# Patient Record
Sex: FEMALE | Race: WHITE | URBAN - METROPOLITAN AREA
[De-identification: names, ages, dates, MRNs, and addresses within clinical notes are randomized per-mention and may not be internally consistent; named-entity substitution may affect disease eponyms.]

---

## 2018-04-02 ENCOUNTER — OFFICE VISIT (OUTPATIENT)
Dept: ORTHOPEDIC SURGERY | Age: 63
End: 2018-04-02

## 2018-04-02 VITALS
HEIGHT: 62 IN | OXYGEN SATURATION: 95 % | BODY MASS INDEX: 24.8 KG/M2 | WEIGHT: 134.8 LBS | SYSTOLIC BLOOD PRESSURE: 133 MMHG | DIASTOLIC BLOOD PRESSURE: 84 MMHG | HEART RATE: 74 BPM

## 2018-04-02 DIAGNOSIS — M54.16 LUMBAR NEURITIS: ICD-10-CM

## 2018-04-02 DIAGNOSIS — M47.816 SPONDYLOSIS OF LUMBAR REGION WITHOUT MYELOPATHY OR RADICULOPATHY: ICD-10-CM

## 2018-04-02 DIAGNOSIS — M54.5 LOW BACK PAIN, UNSPECIFIED BACK PAIN LATERALITY, UNSPECIFIED CHRONICITY, WITH SCIATICA PRESENCE UNSPECIFIED: Primary | ICD-10-CM

## 2018-04-02 DIAGNOSIS — M51.36 DDD (DEGENERATIVE DISC DISEASE), LUMBAR: ICD-10-CM

## 2018-04-02 RX ORDER — METHYLPREDNISOLONE 4 MG/1
TABLET ORAL
Qty: 1 DOSE PACK | Refills: 0 | Status: SHIPPED | OUTPATIENT
Start: 2018-04-02 | End: 2018-04-30 | Stop reason: ALTCHOICE

## 2018-04-02 RX ORDER — CHOLECALCIFEROL (VITAMIN D3) 125 MCG
CAPSULE ORAL
COMMUNITY

## 2018-04-02 RX ORDER — IBUPROFEN 200 MG
CAPSULE ORAL
COMMUNITY

## 2018-04-02 NOTE — PROGRESS NOTES
[facility]  [address], [address]  Phone: [phone]  Fax: [phone]        INITIAL CONSULTATION      HISTORY OF PRESENT ILLNESS:  Oracio Marshall is a 66 y.o. female whom is self-referred secondary to progressive low back pain extending into the BLE (L>R) in an L5 distribution to the ankle in the LLE and in a similar distribution to the mid-thigh in the RLE x 1+ year. Pt did experience symptoms extending into the left foot on a single occasion. Low back pain > extremity symptoms. No specific injury/trauma. She rates pain 6/10. 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 Pt self-treats with Naproxen and Ibuprofen. Pt denies h/o lumbar spinal surgery/blocks. She has not attended physical therapy/chiropractor. Pt denies change in bowel or bladder habits. Pt denies fever, weight loss, or skin changes. Pt denies h/o DM, gastric bypass, stomach ulcers or bleeding disorders. The patient is RHD.  reviewed. Body mass index is 24.66 kg/(m^2). History reviewed. No pertinent past medical history. History reviewed. No pertinent surgical history. Social History   Substance Use Topics    Smoking status: Never Smoker    Smokeless tobacco: Never Used    Alcohol use No     Work status: Not available. Marital status: Not available. Current Outpatient Prescriptions   Medication Sig Dispense Refill    naproxen sodium 220 mg cap Take  by mouth.  ibuprofen 200 mg cap Take  by mouth.       methylPREDNISolone (MEDROL DOSEPACK) 4 mg tablet Per dose pack instructions 1 Dose Pack 0       No Known Allergies         Family History   Problem Relation Age of Onset    Stroke Mother     Arthritis-osteo Mother     Stroke Father     Parkinson's Disease Father     Cancer Sister          REVIEW OF SYSTEMS  Constitutional symptoms: Negative  Eyes: Negative  Ears, Nose, Throat, and Mouth: Negative  Cardiovascular: Negative  Respiratory: Negative  Genitourinary: Negative  Integumentary (Skin and/or breast): Negative  Musculoskeletal: Positive for low back pain into the BLE. Extremities: Negative for edema. Endocrine/Rheumatologic: Negative  Hematologic/Lymphatic: Negative  Allergic/Immunologic: Negative  Psychiatric: Negative       PHYSICAL EXAMINATION    Visit Vitals    /84 (BP 1 Location: Left arm, BP Patient Position: Sitting)    Pulse 74    Ht 5' 2\" (1.575 m)    Wt 134 lb 12.8 oz (61.1 kg)    LMP  (LMP Unknown)    SpO2 95%    BMI 24.66 kg/m2       CONSTITUTIONAL: NAD, A&O x 3  HEART: Regular rate and rhythm  ABDOMEN: Positive bowel sounds, soft, nontender, and nondistended  LUNGS: Clear to auscultation bilaterally. SKIN: Negative for rash. RANGE OF MOTION: The patient has full passive range of motion in all four extremities. SENSATION: Sensation is intact to light touch throughout. MOTOR:   Straight Leg Raise: Negative, bilateral  Gutierrez: Negative, bilateral  Deep tendon reflexes are 0 at the brachioradialis and triceps, 1+ at the biceps. Deep tendon reflexes are 2+ at the knees and 0 at the ankles bilaterally. Shoulder AB/Flex Elbow Flex Wrist Ext Elbow Ext Wrist Flex Hand Intrin Tone   Right +4/5 +4/5 +4/5 +4/5 +4/5 +4/5 +4/5   Left +4/5 +4/5 +4/5 +4/5 +4/5 +4/5 +4/5              Hip Flex Knee Ext Knee Flex Ankle DF GTE Ankle PF Tone   Right +4/5 +4/5 +4/5 +4/5 +4/5 +4/5 +4/5   Left +4/5 +4/5 +4/5 +4/5 +4/5 +4/5 +4/5     RADIOGRAPHS  Preliminary reading of lumbar plain films:  Mild disc space narrowing at L1-2, L2-3, L4-5, L5-S1. Small anterior osteophytes noted throughout the lumbar spine. No acute pathology identified. These are being sent out for official reading by Dr. Michael Cardozo. ASSESSMENT   Diagnoses and all orders for this visit:    1.  Low back pain, unspecified back pain laterality, unspecified chronicity, with sciatica presence unspecified  - [17195] L/S 2-3 views  -     REFERRAL TO PHYSICAL THERAPY    2. Spondylosis of lumbar region without myelopathy or radiculopathy  -     REFERRAL TO PHYSICAL THERAPY    3. Lumbar neuritis  -     REFERRAL TO PHYSICAL THERAPY    4. DDD (degenerative disc disease), lumbar  -     REFERRAL TO PHYSICAL THERAPY    Other orders  -     methylPREDNISolone (MEDROL DOSEPACK) 4 mg tablet; Per dose pack instructions           IMPRESSIONS/RECOMMENDATIONS:  The patient describes symptoms consistent with L5 radiculopathy. She is neurologically intact. I discussed with patient multiple treatment options including medications, physical therapy, lumbar blocks, and surgery. I will start her on Medrol Dosepak. She will resume her OTC Ibuprofen 800 mg TID x 2 weeks following completion of the Medrol Dosepak. I will refer her to physical therapy with an emphasis on HEP. I will see the patient back in 1 month's time or earlier if needed. Written by Sunil Melendez, as dictated by Shanell Calvillo MD  I examined the patient, reviewed and agree with the note.

## 2018-04-02 NOTE — MR AVS SNAPSHOT
[address] [facility] 
 
 
 [address] [facility] 
[phone] Patient: Earline Gannon MRN: [id number] Visit Information Date & Time Provider Department Dept. Phone Encounter #  
 4/2/2018  3:05 PM Ariane Moe MD [facility] [place] [phone] [id number] Follow-up Instructions Return in about 4 weeks (around 4/30/2018). Upcoming Health Maintenance Date Due DTaP/Tdap/Td series (1 - Tdap) 2/6/1978 PAP AKA CERVICAL CYTOLOGY 2/6/1978 BREAST CANCER SCRN MAMMOGRAM 2/6/2007 FOBT Q 1 YEAR AGE 50-75 2/6/2007 ZOSTER VACCINE AGE 60> 12/6/2016 Influenza Age 5 to Adult 8/1/2017 Allergies as of 4/2/2018  Review Complete On: 4/2/2018 By: Ariane Moe MD  
 No Known Allergies Current Immunizations  Never Reviewed No immunizations on file. Not reviewed this visit You Were Diagnosed With   
  
 Codes Comments Low back pain, unspecified back pain laterality, unspecified chronicity, with sciatica presence unspecified    -  Primary ICD-10-CM: M54.5 ICD-9-CM: 724.2 Spondylosis of lumbar region without myelopathy or radiculopathy     ICD-10-CM: M47.816 ICD-9-CM: 721.3 Lumbar neuritis     ICD-10-CM: M54.16 
ICD-9-CM: 724.4 DDD (degenerative disc disease), lumbar     ICD-10-CM: M51.36 
ICD-9-CM: 722.52 Vitals BP Pulse Height(growth percentile) Weight(growth percentile) LMP SpO2  
 133/84 (BP 1 Location: Left arm, BP Patient Position: Sitting) 74 5' 2\" (1.575 m) 134 lb 12.8 oz (61.1 kg) (LMP Unknown) 95% BMI OB Status Smoking Status 24.66 kg/m2 Postmenopausal Never Smoker Vitals History BMI and BSA Data Body Mass Index Body Surface Area  
 24.66 kg/m 2 1.63 m 2 Preferred Pharmacy Pharmacy Name Phone [facility] [address], [address] [place] [phone] Your Updated Medication List  
  
   
This list is accurate as of 4/2/18  4:08 PM.  Always use your most recent med list.  
  
  
  
  
 ibuprofen 200 mg Cap Take  by mouth.  
  
 methylPREDNISolone 4 mg tablet Commonly known as:  Arleen Barrera Per dose pack instructions  
  
 naproxen sodium 220 mg Cap Take  by mouth. Prescriptions Sent to Pharmacy Refills  
 methylPREDNISolone (MEDROL DOSEPACK) 4 mg tablet 0 Sig: Per dose pack instructions Class: Normal  
 Pharmacy: [facility] [address], [facility] #: [phone] We Performed the Following AMB POC XRAY, SPINE, LUMBOSACRAL; 2 O [38106 CPT(R)] REFERRAL TO PHYSICAL THERAPY [JNZ59 Custom] Comments:  
 DX: eval and treat lsp lle HEP 
LOCATION: [place] [address] 
2-3 visits/ 2-3 weeks Follow-up Instructions Return in about 4 weeks (around 4/30/2018). Referral Information Referral ID Referred By Referred To  
  
 [id number] Carmel Mckeon Not Available Visits Status Start Date End Date 1 New Request 4/2/18 4/2/19 If your referral has a status of pending review or denied, additional information will be sent to support the outcome of this decision. Introducing [facility] & HEALTH SERVICES! [employer] introduces [employer] patient portal. Now you can access parts of your medical record, email your doctor's office, and request medication refills online. 1. In your internet browser, go to [email or web address] 2. Click on the First Time User? Click Here link in the Sign In box. You will see the New Member Sign Up page. 3. Enter your Lore Access Code exactly as it appears below. You will not need to use this code after youve completed the sign-up process. If you do not sign up before the expiration date, you must request a new code. · Lore Access Code: [email or web address] Expires: 7/1/2018  2:56 PM 
 
 4. Enter the last four digits of your Social Security Number (xxxx) and Date of Birth (mm/dd/yyyy) as indicated and click Submit. You will be taken to the next sign-up page. 5. Create a Shozu ID. This will be your Shozu login ID and cannot be changed, so think of one that is secure and easy to remember. 6. Create a Shozu password. You can change your password at any time. 7. Enter your Password Reset Question and Answer. This can be used at a later time if you forget your password. 8. Enter your e-mail address. You will receive e-mail notification when new information is available in [address] 9. Click Sign Up. You can now view and download portions of your medical record. 10. Click the Download Summary menu link to download a portable copy of your medical information. If you have questions, please visit the Frequently Asked Questions section of the Shozu website. Remember, Shozu is NOT to be used for urgent needs. For medical emergencies, dial 911. Now available from your iPhone and Android! Please provide this summary of care documentation to your next provider. Your primary care clinician is listed as Tony Watkins. If you have any questions after today's visit, please call [phone].

## 2018-04-30 ENCOUNTER — OFFICE VISIT (OUTPATIENT)
Dept: ORTHOPEDIC SURGERY | Age: 63
End: 2018-04-30

## 2018-04-30 VITALS
DIASTOLIC BLOOD PRESSURE: 82 MMHG | HEIGHT: 62 IN | BODY MASS INDEX: 24.66 KG/M2 | WEIGHT: 134 LBS | HEART RATE: 66 BPM | SYSTOLIC BLOOD PRESSURE: 121 MMHG

## 2018-04-30 DIAGNOSIS — M47.816 SPONDYLOSIS OF LUMBAR REGION WITHOUT MYELOPATHY OR RADICULOPATHY: Primary | ICD-10-CM

## 2018-04-30 DIAGNOSIS — M54.16 LUMBAR NEURITIS: ICD-10-CM

## 2018-04-30 DIAGNOSIS — M51.36 DDD (DEGENERATIVE DISC DISEASE), LUMBAR: ICD-10-CM

## 2018-04-30 NOTE — MR AVS SNAPSHOT
[address] [facility] 
 
 
 [address] 200 [facility] 
[phone] Patient: Jesus Vann MRN: [id number] Visit Information Date & Time Provider Department Dept. Phone Encounter #  
 4/30/2018  3:40 PM Carie Blackburn MD [facility], [address] and [facility] [place] [phone] Follow-up Instructions Return in about 4 weeks (around 5/28/2018). Upcoming Health Maintenance Date Due Hepatitis C Screening 1955 DTaP/Tdap/Td series (1 - Tdap) 2/6/1978 PAP AKA CERVICAL CYTOLOGY 2/6/1978 BREAST CANCER SCRN MAMMOGRAM 2/6/2007 FOBT Q 1 YEAR AGE 50-75 2/6/2007 ZOSTER VACCINE AGE 60> 12/6/2016 Influenza Age 5 to Adult 8/1/2018 Allergies as of 4/30/2018  Review Complete On: 4/30/2018 By: Carie Blackburn MD  
 No Known Allergies Current Immunizations  Never Reviewed No immunizations on file. Not reviewed this visit You Were Diagnosed With   
  
 Codes Comments Spondylosis of lumbar region without myelopathy or radiculopathy    -  Primary ICD-10-CM: M47.816 ICD-9-CM: 721.3 Lumbar neuritis     ICD-10-CM: M54.16 
ICD-9-CM: 724.4 DDD (degenerative disc disease), lumbar     ICD-10-CM: M51.36 
ICD-9-CM: 722.52 Vitals BP Pulse Height(growth percentile) Weight(growth percentile) LMP BMI  
 121/82 66 5' 2\" (1.575 m) 134 lb (60.8 kg) (LMP Unknown) 24.51 kg/m2 OB Status Smoking Status Postmenopausal Never Smoker Vitals History BMI and BSA Data Body Mass Index Body Surface Area 24.51 kg/m 2 1.63 m 2 Preferred Pharmacy Pharmacy Name Phone [facility] [address], [address] [place] [phone] Your Updated Medication List  
  
   
This list is accurate as of 4/30/18  4:23 PM.  Always use your most recent med list.  
  
  
  
  
 ibuprofen 200 mg Cap Take  by mouth. naproxen sodium 220 mg Cap Take  by mouth. Follow-up Instructions Return in about 4 weeks (around 5/28/2018). Introducing [facility] & HE[facility]! [facility] introduces [employer] patient portal. Now you can access parts of your medical record, email your doctor's office, and request medication refills online. 1. In your internet browser, go to [email or web address] 2. Click on the First Time User? Click Here link in the Sign In box. You will see the New Member Sign Up page. 3. Enter your [employer] Access Code exactly as it appears below. You will not need to use this code after youve completed the sign-up process. If you do not sign up before the expiration date, you must request a new code. · [employer] Access Code: [email or web address] Expires: 7/1/2018  2:56 PM 
 
4. Enter the last four digits of your Social Security Number (xxxx) and Date of Birth (mm/dd/yyyy) as indicated and click Submit. You will be taken to the next sign-up page. 5. Create a [employer] ID. This will be your [employer] login ID and cannot be changed, so think of one that is secure and easy to remember. 6. Create a [employer] password. You can change your password at any time. 7. Enter your Password Reset Question and Answer. This can be used at a later time if you forget your password. 8. Enter your e-mail address. You will receive e-mail notification when new information is available in [address] 9. Click Sign Up. You can now view and download portions of your medical record. 10. Click the Download Summary menu link to download a portable copy of your medical information. If you have questions, please visit the Frequently Asked Questions section of the [employer] website. Remember, [employer] is NOT to be used for urgent needs. For medical emergencies, dial 911. Now available from your iPhone and Android! Please provide this summary of care documentation to your next provider. Your primary care clinician is listed as Savanah Lazcano. If you have any questions after today's visit, please call [phone].

## 2018-04-30 NOTE — PROGRESS NOTES
[facility]  [address] [facility]  Anna Aldrich, [address]  Phone: [phone]  Fax: [phone]        PROGRESS NOTE      HISTORY OF PRESENT ILLNESS:  The patient is a 66 y.o. female and was seen today for follow up of progressive low back pain extending into the BLE (L>R) in an L5 distribution to the ankle in the LLE and in a similar distribution to the mid-thigh in the RLE x 1+ year. Pt did experience symptoms extending into the left foot on a single occasion. Low back pain > extremity symptoms. No specific injury/trauma. 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 Pt self-treats with Naproxen and Ibuprofen. Pt denies h/o lumbar spinal surgery/blocks. Pt denies change in bowel or bladder habits. Pt denies fever, weight loss, or skin changes. Pt denies h/o DM, gastric bypass, stomach ulcers or bleeding disorders. Preliminary reading of lumbar plain films revealed mild disc space narrowing at L1-2, L2-3, L4-5, L5-S1. Small anterior osteophytes noted throughout the lumbar spine. No acute pathology identified. The patient is RHD. At her last clinical appointment, the patient described symptoms consistent with L5 radiculopathy. She was neurologically intact. I discussed with patient multiple treatment options including medications, physical therapy, lumbar blocks, and surgery. I started her on Medrol Dosepak. She resumed her OTC Ibuprofen 800 mg TID x 2 weeks following completion of the Medrol Dosepak. I referred her to physical therapy with an emphasis on HEP. The patient returns today with pain location and distribution remain unchanged. She rates pain 4/10, slightly improved since her last visit (6/10). Pt continues to be enrolled in physical therapy with moderate relief. She noted no significant improvement with MDP.  reviewed. Body mass index is 24.51 kg/(m^2).       PCP: Bartolome Sellers, MD      History reviewed. No pertinent past medical history. Social History     Social History    Marital status:      Spouse name: N/A    Number of children: N/A    Years of education: N/A     Occupational History    Not on file. Social History Main Topics    Smoking status: Never Smoker    Smokeless tobacco: Never Used    Alcohol use No    Drug use: No    Sexual activity: Not on file     Other Topics Concern    Not on file     Social History Narrative       Current Outpatient Prescriptions   Medication Sig Dispense Refill    naproxen sodium 220 mg cap Take  by mouth.  ibuprofen 200 mg cap Take  by mouth. No Known Allergies       PHYSICAL EXAMINATION    Visit Vitals    /82    Pulse 66    Ht 5' 2\" (1.575 m)    Wt 60.8 kg (134 lb)    LMP  (LMP Unknown)    BMI 24.51 kg/m2       CONSTITUTIONAL: NAD, A&O x 3  SENSATION: Intact to light touch throughout  RANGE OF MOTION: The patient has full passive range of motion in all four extremities. MOTOR:  Straight Leg Raise: Negative, bilateral               Hip Flex Knee Ext Knee Flex Ankle DF GTE Ankle PF Tone   Right +4/5 +4/5 +4/5 +4/5 +4/5 +4/5 +4/5   Left +4/5 +4/5 +4/5 +4/5 +4/5 +4/5 +4/5       ASSESSMENT   Diagnoses and all orders for this visit:    1. Spondylosis of lumbar region without myelopathy or radiculopathy    2. Lumbar neuritis    3. DDD (degenerative disc disease), lumbar          IMPRESSION AND PLAN:  Patient should complete physical therapy as prescribed and continue with her HEP daily. She declines aggressive treatment and would like to continue the current course of treatment. I will see the patient back in 1 month's time or earlier if needed. Written by John Segura, as dictated by Leia Whiteside MD  I examined the patient, reviewed and agree with the note.

## 2018-05-22 ENCOUNTER — TELEPHONE (OUTPATIENT)
Dept: ORTHOPEDIC SURGERY | Age: 63
End: 2018-05-22

## 2018-05-22 NOTE — TELEPHONE ENCOUNTER
Patient called in states at her last appt Dr. Michael Lowe discussed either trying Lyrica or Neurontin. Pt is requesting to start one of these now so when she has her appt on 06/04/18 she will know if she would like to continue it. Patient states she would prefer Lyrica to try but which even Dr. Michael Lowe thinks she should do she will. Patient uses [facility] in [address] [place] [address] as her pharmacy. She can be reached at [phone] this is a work number she states you may just ask for her.

## 2018-05-22 NOTE — TELEPHONE ENCOUNTER
Please review message below and advise.      Last Visit: 04/30/2018 with MD Tamra Howe    Next Appointment: 06/04/2018 with MD Tamra Howe

## 2018-05-22 NOTE — TELEPHONE ENCOUNTER
I would typically recommend Neurontin 1st as insurance is much more likely to deny the Lyrica if she hasnt tried neurontin 1st.  !00mg po TID ramp # 90 RF#1. Patient needs to realize her f/u appointment date will not give us enouge time to see if the med is helping, but will let us see if she is tolerating it.

## 2018-05-24 RX ORDER — GABAPENTIN 100 MG/1
100 CAPSULE ORAL 3 TIMES DAILY
Qty: 90 CAP | Refills: 1 | Status: SHIPPED | OUTPATIENT
Start: 2018-05-24 | End: 2020-04-14

## 2018-05-24 NOTE — TELEPHONE ENCOUNTER
Spoke to the patient and informed her of the previous message. I have sent in Neurontin 100mg for her and I have rescheduled her appointment as well. Advised patient to call back if she has any problems with the medication or any questions.

## 2018-06-20 ENCOUNTER — OFFICE VISIT (OUTPATIENT)
Dept: ORTHOPEDIC SURGERY | Age: 63
End: 2018-06-20

## 2018-06-20 VITALS
HEART RATE: 66 BPM | HEIGHT: 62 IN | SYSTOLIC BLOOD PRESSURE: 125 MMHG | WEIGHT: 135 LBS | BODY MASS INDEX: 24.84 KG/M2 | DIASTOLIC BLOOD PRESSURE: 89 MMHG

## 2018-06-20 DIAGNOSIS — M54.16 LUMBAR RADICULOPATHY: ICD-10-CM

## 2018-06-20 DIAGNOSIS — M47.816 SPONDYLOSIS OF LUMBAR REGION WITHOUT MYELOPATHY OR RADICULOPATHY: Primary | ICD-10-CM

## 2018-06-20 DIAGNOSIS — M51.36 DDD (DEGENERATIVE DISC DISEASE), LUMBAR: ICD-10-CM

## 2018-06-20 RX ORDER — GABAPENTIN 300 MG/1
300 CAPSULE ORAL 3 TIMES DAILY
Qty: 90 CAP | Refills: 1 | Status: SHIPPED | OUTPATIENT
Start: 2018-06-20 | End: 2020-04-14

## 2018-06-20 NOTE — MR AVS SNAPSHOT
[facility] 
 
 
 [address] [facility] 
[phone] Patient: Doris Arteaga MRN: [id number]:[id number] Visit Information Date & Time Provider Department Dept. Phone Encounter #  
 6/20/2018  3:40 PM Grzegorz Mcgraw MD 4 [facility], [address] and [facility] [place] [phone] Follow-up Instructions Return in about 4 weeks (around 7/18/2018). Upcoming Health Maintenance Date Due Hepatitis C Screening 1955 DTaP/Tdap/Td series (1 - Tdap) 2/6/1978 PAP AKA CERVICAL CYTOLOGY 2/6/1978 BREAST CANCER SCRN MAMMOGRAM 2/6/2007 FOBT Q 1 YEAR AGE 50-75 2/6/2007 ZOSTER VACCINE AGE 60> 12/6/2016 Influenza Age 5 to Adult 8/1/2018 Allergies as of 6/20/2018  Review Complete On: 6/20/2018 By: Grzegorz Mcgraw MD  
 No Known Allergies Current Immunizations  Never Reviewed No immunizations on file. Not reviewed this visit Vitals BP Pulse Height(growth percentile) Weight(growth percentile) LMP BMI  
 125/89 66 5' 2\" (1.575 m) 135 lb (61.2 kg) (LMP Unknown) 24.69 kg/m2 OB Status Smoking Status Postmenopausal Never Smoker Vitals History BMI and BSA Data Body Mass Index Body Surface Area  
 24.69 kg/m 2 1.64 m 2 Preferred Pharmacy Pharmacy Name Phone [address] [facility] [address], [address] [place] [phone] Your Updated Medication List  
  
   
This list is accurate as of 6/20/18  4:58 PM.  Always use your most recent med list.  
  
  
  
  
 * gabapentin 100 mg capsule Commonly known as:  NEURONTIN Take 1 Cap by mouth three (3) times daily. * gabapentin 300 mg capsule Commonly known as:  NEURONTIN Take 1 Cap by mouth three (3) times daily. ibuprofen 200 mg Cap Take  by mouth. naproxen sodium 220 mg Cap Take  by mouth. * Notice: This list has 2 medication(s) that are the same as other medications prescribed for you. Read the directions carefully, and ask your doctor or other care provider to review them with you. Prescriptions Sent to Pharmacy Refills  
 gabapentin (NEURONTIN) 300 mg capsule 1 Sig: Take 1 Cap by mouth three (3) times daily. Class: Normal  
 Pharmacy: [facility] [address], [address] [facility] #: [phone] Route: Oral  
  
Follow-up Instructions Return in about 4 weeks (around 7/18/2018). Introducing [address]! [address] introduces [employer] patient portal. Now you can access parts of your medical record, email your doctor's office, and request medication refills online. 1. In your internet browser, go to [email or web address] 2. Click on the First Time User? Click Here link in the Sign In box. You will see the New Member Sign Up page. 3. Enter your [employer] Access Code exactly as it appears below. You will not need to use this code after youve completed the sign-up process. If you do not sign up before the expiration date, you must request a new code. · [employer] Access Code: [email or web address] Expires: 7/1/2018  2:56 PM 
 
4. Enter the last four digits of your Social Security Number (xxxx) and Date of Birth (mm/dd/yyyy) as indicated and click Submit. You will be taken to the next sign-up page. 5. Create a [employer] ID. This will be your [employer] login ID and cannot be changed, so think of one that is secure and easy to remember. 6. Create a [employer] password. You can change your password at any time. 7. Enter your Password Reset Question and Answer. This can be used at a later time if you forget your password. 8. Enter your e-mail address. You will receive e-mail notification when new information is available in [address] 9. Click Sign Up. You can now view and download portions of your medical record. 10. Click the Download Summary menu link to download a portable copy of your medical information. If you have questions, please visit the Frequently Asked Questions section of the [employer] website. Remember, [employer] is NOT to be used for urgent needs. For medical emergencies, dial 911. Now available from your iPhone and Android! Please provide this summary of care documentation to your next provider. Your primary care clinician is listed as Minnette Counter. If you have any questions after today's visit, please call [phone].

## 2018-06-20 NOTE — PROGRESS NOTES
[facility]  [address], [place]   Phone: [phone]  Fax: [phone]        PROGRESS NOTE      HISTORY OF PRESENT ILLNESS:  The patient is a 66 y.o. female and was seen today for follow up of progressive low back pain extending into the BLE (L>R) in an L5 distribution to the ankle in the LLE and in a similar distribution to the mid-thigh in the RLE x 1+ year. Pt did experience symptoms extending into the left foot on a single occasion. Low back pain > extremity symptoms. No specific injury/trauma. 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 Pt self-treats with Naproxen and Ibuprofen. She noted no significant improvement with MDP. Pt denies h/o lumbar spinal surgery/blocks. Pt denies change in bowel or bladder habits. Pt denies fever, weight loss, or skin changes. Pt denies h/o DM, gastric bypass, stomach ulcers or bleeding disorders. Preliminary reading of lumbar plain films revealed mild disc space narrowing at L1-2, L2-3, L4-5, L5-S1. Small anterior osteophytes noted throughout the lumbar spine. No acute pathology identified. The patient is RHD. At her last clinical appointment, the patient completed physical therapy as prescribed and continue with her HEP daily. She declined aggressive treatment and wished to continue the current course of treatment. The patient returns today with pain location and distribution remain unchanged. She rates her pain 0-5/10, consistent with her last visit (4/10). Pt is tolerating Neurontin 100 mg TID with no relief. She reported a little fogginess the first day she took it. Pt has completed PT. She reports completing her HEP x 3 a week. Patient denies history of glaucoma. Pt denies change in bowel or bladder habits.  reviewed. Body mass index is 24.69 kg/(m^2). PCP: Jose Nix MD      History reviewed.  No pertinent past medical history. Social History     Social History    Marital status:      Spouse name: N/A    Number of children: N/A    Years of education: N/A     Occupational History    Not on file. Social History Main Topics    Smoking status: Never Smoker    Smokeless tobacco: Never Used    Alcohol use No    Drug use: No    Sexual activity: Not on file     Other Topics Concern    Not on file     Social History Narrative       Current Outpatient Prescriptions   Medication Sig Dispense Refill    gabapentin (NEURONTIN) 300 mg capsule Take 1 Cap by mouth three (3) times daily. 90 Cap 1    gabapentin (NEURONTIN) 100 mg capsule Take 1 Cap by mouth three (3) times daily. 90 Cap 1    naproxen sodium 220 mg cap Take  by mouth.  ibuprofen 200 mg cap Take  by mouth. No Known Allergies       PHYSICAL EXAMINATION    Visit Vitals    /89    Pulse 66    Ht 5' 2\" (1.575 m)    Wt 61.2 kg (135 lb)    LMP  (LMP Unknown)    BMI 24.69 kg/m2       CONSTITUTIONAL: NAD, A&O x 3  SENSATION: Intact to light touch throughout  RANGE OF MOTION: The patient has full passive range of motion in all four extremities. MOTOR:  Straight Leg Raise: Negative, bilateral               Hip Flex Knee Ext Knee Flex Ankle DF GTE Ankle PF Tone   Right +4/5 +4/5 +4/5 +4/5 +4/5 +4/5 +4/5   Left +4/5 +4/5 +4/5 +4/5 +4/5 +4/5 +4/5       ASSESSMENT   Diagnoses and all orders for this visit:    1. Spondylosis of lumbar region without myelopathy or radiculopathy    2. DDD (degenerative disc disease), lumbar    3. Lumbar radiculopathy    Other orders  -     gabapentin (NEURONTIN) 300 mg capsule; Take 1 Cap by mouth three (3) times daily. IMPRESSION AND PLAN:  I will increase her Neurontin to 300 mg TID. Patient advised to call the office if intolerant to higher dose. Pt wishes to defer lumbar blocks at this time. I recommend she continue her HEP daily.  I will see the patient back in 1 month's time or earlier if needed. Written by Gorge Lee, as dictated by Lurdes Gar MD  I examined the patient, reviewed and agree with the note.

## 2018-07-25 ENCOUNTER — OFFICE VISIT (OUTPATIENT)
Dept: ORTHOPEDIC SURGERY | Age: 63
End: 2018-07-25

## 2018-07-25 VITALS
WEIGHT: 138.4 LBS | OXYGEN SATURATION: 98 % | SYSTOLIC BLOOD PRESSURE: 127 MMHG | RESPIRATION RATE: 16 BRPM | BODY MASS INDEX: 25.47 KG/M2 | HEART RATE: 72 BPM | DIASTOLIC BLOOD PRESSURE: 84 MMHG | HEIGHT: 62 IN

## 2018-07-25 DIAGNOSIS — M51.36 DDD (DEGENERATIVE DISC DISEASE), LUMBAR: ICD-10-CM

## 2018-07-25 DIAGNOSIS — M47.816 SPONDYLOSIS OF LUMBAR REGION WITHOUT MYELOPATHY OR RADICULOPATHY: Primary | ICD-10-CM

## 2018-07-25 DIAGNOSIS — M54.16 LUMBAR RADICULOPATHY: ICD-10-CM

## 2018-07-25 NOTE — MR AVS SNAPSHOT
[address] [facility] 
 
 
 [address] [facility] 
[phone] Patient: Kita Choudhury MRN: [id number] KSP:7/2/2078 Visit Information Date & Time Provider Department Dept. Phone Encounter #  
 7/25/2018  4:00 PM Eder Russell MD [address] [facility] [place] [phone] [id number] Follow-up Instructions Return for following MRI. Upcoming Health Maintenance Date Due Hepatitis C Screening 1955 DTaP/Tdap/Td series (1 - Tdap) 2/6/1978 PAP AKA CERVICAL CYTOLOGY 2/6/1978 BREAST CANCER SCRN MAMMOGRAM 2/6/2007 FOBT Q 1 YEAR AGE 50-75 2/6/2007 ZOSTER VACCINE AGE 60> 12/6/2016 Influenza Age 5 to Adult 8/1/2018 Allergies as of 7/25/2018  Review Complete On: 7/25/2018 By: Eder Russell MD  
 No Known Allergies Current Immunizations  Never Reviewed No immunizations on file. Not reviewed this visit You Were Diagnosed With   
  
 Codes Comments Spondylosis of lumbar region without myelopathy or radiculopathy    -  Primary ICD-10-CM: M47.816 ICD-9-CM: 721.3 DDD (degenerative disc disease), lumbar     ICD-10-CM: M51.36 
ICD-9-CM: 722.52 Lumbar radiculopathy     ICD-10-CM: M54.16 
ICD-9-CM: 724.4 Vitals BP Pulse Resp Height(growth percentile) Weight(growth percentile) LMP  
 127/84 72 16 5' 2\" (1.575 m) 138 lb 6.4 oz (62.8 kg) (LMP Unknown) SpO2 BMI OB Status Smoking Status 98% 25.31 kg/m2 Postmenopausal Never Smoker BMI and BSA Data Body Mass Index Body Surface Area  
 25.31 kg/m 2 1.66 m 2 Preferred Pharmacy Pharmacy Name Phone [facility], [address] [place] [phone] Your Updated Medication List  
  
   
This list is accurate as of 7/25/18  4:43 PM.  Always use your most recent med list.  
  
  
  
  
 aspirin-acetaminophen-caffeine 250-250-65 mg per tablet Commonly known as:  EXCEDRIN ES Take 1 Tab by mouth. * gabapentin 100 mg capsule Commonly known as:  NEURONTIN Take 1 Cap by mouth three (3) times daily. * gabapentin 300 mg capsule Commonly known as:  NEURONTIN Take 1 Cap by mouth three (3) times daily. ibuprofen 200 mg Cap Take  by mouth. naproxen sodium 220 mg Cap Take  by mouth. * Notice: This list has 2 medication(s) that are the same as other medications prescribed for you. Read the directions carefully, and ask your doctor or other care provider to review them with you. Follow-up Instructions Return for following MRI. To-Do List   
 08/01/2018 Imaging:  MRI LUMB SPINE WO CONT Introducing [facility]! Tramaine Mobley introduces [employer] patient portal. Now you can access parts of your medical record, email your doctor's office, and request medication refills online. 1. In your internet browser, go to [email or web address] 2. Click on the First Time User? Click Here link in the Sign In box. You will see the New Member Sign Up page. 3. Enter your [employer] Access Code exactly as it appears below. You will not need to use this code after youve completed the sign-up process. If you do not sign up before the expiration date, you must request a new code. · [employer] Access Code: [email or web address] Expires: 10/23/2018  3:54 PM 
 
4. Enter the last four digits of your Social Security Number (xxxx) and Date of Birth (mm/dd/yyyy) as indicated and click Submit. You will be taken to the next sign-up page. 5. Create a [employer] exp[employer]st ID. This will be your [employer] login ID and cannot be changed, so think of one that is secure and easy to remember. 6. Create a [employer] password. You can change your password at any time. 7. Enter your Password Reset Question and Answer. This can be used at a later time if you forget your password. 8. Enter your e-mail address. You will receive e-mail notification when new information is available in [address] 9. Click Sign Up. You can now view and download portions of your medical record. 10. Click the Download Summary menu link to download a portable copy of your medical information. If you have questions, please visit the Frequently Asked Questions section of the [employer] website. Remember, [employer] is NOT to be used for urgent needs. For medical emergencies, dial 911. Now available from your iPhone and Android! Please provide this summary of care documentation to your next provider. Your primary care clinician is listed as Parmjit Christie. If you have any questions after today's visit, please call [phone].

## 2018-07-25 NOTE — PROGRESS NOTES
[facility]  [address] [facility]  [place], [address]  Phone: [phone]  Fax: [phone]        PROGRESS NOTE      HISTORY OF PRESENT ILLNESS:  The patient is a 66 y.o. female and was seen today for follow up of progressive low back pain extending into the BLE (L>R) in an L5 distribution to the ankle in the LLE and in a similar distribution to the mid-thigh in the RLE x 1+ year. Pt did experience symptoms extending into the left foot on a single occasion. Low back pain > extremity symptoms. No specific injury/trauma. 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 Pt self-treats with Naproxen and Ibuprofen. She noted no significant improvement with MDP. Pt has completed PT. Pt denies h/o lumbar spinal surgery/blocks. Pt denies change in bowel or bladder habits. Pt denies fever, weight loss, or skin changes. Pt denies h/o DM, gastric bypass, stomach ulcers or bleeding disorders. Preliminary reading of lumbar plain films revealed mild disc space narrowing at L1-2, L2-3, L4-5, L5-S1. Small anterior osteophytes noted throughout the lumbar spine. No acute pathology identified. The patient is RHD. At her last clinical appointment, I increased her Neurontin to 300 mg TID. Pt wished to defer lumbar blocks at that time. The patient returns today with pain location and distribution remain unchanged. She rates her pain 4/10, consistent with her last visit (0-5/10). She is not tolerating increased Neurontin 300 mg TID due to lethargy. She self discontinued her afternoon dose about 1 week ago. She reports completing her HEP daily. Pt denies change in bowel or bladder habits.  reviewed. Body mass index is 25.31 kg/(m^2). PCP: Parmjit Christie MD      History reviewed. No pertinent past medical history.      Social History     Social History    Marital status:      Spouse name: N/A    Number of children: N/A    Years of education: N/A     Occupational History    Not on file. Social History Main Topics    Smoking status: Never Smoker    Smokeless tobacco: Never Used    Alcohol use No    Drug use: No    Sexual activity: Not on file     Other Topics Concern    Not on file     Social History Narrative       Current Outpatient Prescriptions   Medication Sig Dispense Refill    aspirin-acetaminophen-caffeine (EXCEDRIN ES) 250-250-65 mg per tablet Take 1 Tab by mouth.  gabapentin (NEURONTIN) 300 mg capsule Take 1 Cap by mouth three (3) times daily. (Patient taking differently: Take 300 mg by mouth two (2) times a day.) 90 Cap 1    ibuprofen 200 mg cap Take  by mouth.  gabapentin (NEURONTIN) 100 mg capsule Take 1 Cap by mouth three (3) times daily. 90 Cap 1    naproxen sodium 220 mg cap Take  by mouth. No Known Allergies       PHYSICAL EXAMINATION    Visit Vitals    /84    Pulse 72    Resp 16    Ht 5' 2\" (1.575 m)    Wt 62.8 kg (138 lb 6.4 oz)    LMP  (LMP Unknown)    SpO2 98%    BMI 25.31 kg/m2       CONSTITUTIONAL: NAD, A&O x 3  SENSATION: Intact to light touch throughout  RANGE OF MOTION: The patient has full passive range of motion in all four extremities. MOTOR:  Straight Leg Raise: Negative, bilateral               Hip Flex Knee Ext Knee Flex Ankle DF GTE Ankle PF Tone   Right +4/5 +4/5 +4/5 +4/5 +4/5 +4/5 +4/5   Left +4/5 +4/5 +4/5 +4/5 +4/5 +4/5 +4/5       ASSESSMENT   Diagnoses and all orders for this visit:    1. Spondylosis of lumbar region without myelopathy or radiculopathy  -     MRI LUMB SPINE WO CONT; Future    2. DDD (degenerative disc disease), lumbar  -     MRI LUMB SPINE WO CONT; Future    3. Lumbar radiculopathy  -     MRI LUMB SPINE WO CONT; Future          IMPRESSION AND PLAN:  Patient should wean off Neurontin 300 mg TID. Patient is considering blocks. I will set her up for a lumbar spine MRI.  I advised patient to bring copies of films to next visit. I offered to start her on another neuropathic pain medication, but she wished to defer at this time. Patient is neurologically intact. I recommend she continue with her HEP daily. I will see the patient back following MRI. Written by Eliberto Sicard, as dictated by Jayda Aguirre MD  I examined the patient, reviewed and agree with the note.

## 2018-07-26 ENCOUNTER — DOCUMENTATION ONLY (OUTPATIENT)
Dept: ORTHOPEDIC SURGERY | Age: 63
End: 2018-07-26

## 2018-07-26 NOTE — PROGRESS NOTES
MRI Lumbar without is scheduled at [facility], [phone], on 07/28/18, arrive 10:45AM, test 11:00AM. No [facility] pre-authorization required.

## 2018-08-20 ENCOUNTER — TELEPHONE (OUTPATIENT)
Dept: ORTHOPEDIC SURGERY | Age: 63
End: 2018-08-20

## 2018-08-20 NOTE — TELEPHONE ENCOUNTER
Pt is calling to speak with Dr. Keren Marie nurse Boris Bronson about her upcoming appt 8/22/18. Please advise pt at [phone].

## 2018-08-21 NOTE — TELEPHONE ENCOUNTER
Spoke to patient this morning. She had questions regarding injections. Questions were answered. Patient is aware of her appointment on tomorrow and will bring the MRI disk with her.

## 2018-08-22 ENCOUNTER — OFFICE VISIT (OUTPATIENT)
Dept: ORTHOPEDIC SURGERY | Age: 63
End: 2018-08-22

## 2018-08-22 VITALS
DIASTOLIC BLOOD PRESSURE: 88 MMHG | HEART RATE: 68 BPM | SYSTOLIC BLOOD PRESSURE: 125 MMHG | BODY MASS INDEX: 24.88 KG/M2 | HEIGHT: 62 IN | RESPIRATION RATE: 14 BRPM | WEIGHT: 135.2 LBS

## 2018-08-22 DIAGNOSIS — M47.816 SPONDYLOSIS OF LUMBAR REGION WITHOUT MYELOPATHY OR RADICULOPATHY: Primary | ICD-10-CM

## 2018-08-22 DIAGNOSIS — M51.26 HNP (HERNIATED NUCLEUS PULPOSUS), LUMBAR: ICD-10-CM

## 2018-08-22 DIAGNOSIS — M51.36 DDD (DEGENERATIVE DISC DISEASE), LUMBAR: ICD-10-CM

## 2018-08-22 DIAGNOSIS — M54.16 LUMBAR RADICULOPATHY: ICD-10-CM

## 2018-08-22 NOTE — PROGRESS NOTES
[facility]  [address]  [address], [address]   Phone: [phone]  Fax: [phone]        PROGRESS NOTE      HISTORY OF PRESENT ILLNESS:  The patient is a 66 y.o. female and was seen today for follow up of progressive low back pain extending into the BLE (L>R) in an L5 distribution to the ankle in the LLE and in a similar distribution to the mid-thigh in the RLE x 1+ year. Pt did experience symptoms extending into the left foot on a single occasion. Low back pain > extremity symptoms. No specific injury/trauma. 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 Pt self-treats with Naproxen and Ibuprofen. She noted no significant improvement with MDP. She did not tolerate increased Neurontin 300 mg TID due to lethargy. Pt has completed PT. Pt denies h/o lumbar spinal surgery/blocks. Pt denies change in bowel or bladder habits. Pt denies fever, weight loss, or skin changes. Pt denies h/o DM, gastric bypass, stomach ulcers or bleeding disorders. Preliminary reading of lumbar plain films revealed mild disc space narrowing at L1-2, L2-3, L4-5, L5-S1. Small anterior osteophytes noted throughout the lumbar spine. No acute pathology identified. The patient is RHD. At her last clinical appointment, patient weaned off Neurontin 300 mg TID. Patient was considering blocks. I set her up for a lumbar spine MRI. I offered to start her on another neuropathic pain medication, but she wished to defer at that time. The patient returns today with pain location and distribution remain unchanged. She rates her pain 5-7/10, an increase from her last visit (4/10). She reports completing her HEP daily. Her pain is exacerbated by sitting. Pt denies change in bowel or bladder habits. Lumbar spine MRI dated 7/30/18 reviewed.  Per report, multilevel degenerative joint and disc disease with degenerative facet arthropathy at L3/4 and L4/5. This is most significant at L5/S1 where there is moderate discogenic marrow edema. There is also a ventral protruding disc at L5/S1. Posterior disc protrusions/herniations at L1/2 through L5/S1 encroaching upon the ventral thecal sac without significant level spinal canal stenosis. Foraminal narrowing at L2-L5/S1 without nerve root impingement. Upon review myself, there appears to be an annular tear on the left at L4.  reviewed. Body mass index is 24.73 kg/(m^2). PCP: Brina Hardy MD      History reviewed. No pertinent past medical history. Social History     Social History    Marital status:      Spouse name: N/A    Number of children: N/A    Years of education: N/A     Occupational History    Not on file. Social History Main Topics    Smoking status: Never Smoker    Smokeless tobacco: Never Used    Alcohol use No    Drug use: No    Sexual activity: Not on file     Other Topics Concern    Not on file     Social History Narrative       Current Outpatient Prescriptions   Medication Sig Dispense Refill    aspirin-acetaminophen-caffeine (EXCEDRIN ES) 250-250-65 mg per tablet Take 1 Tab by mouth.  ibuprofen 200 mg cap Take  by mouth.  gabapentin (NEURONTIN) 300 mg capsule Take 1 Cap by mouth three (3) times daily. (Patient not taking: Reported on 8/22/2018) 90 Cap 1    gabapentin (NEURONTIN) 100 mg capsule Take 1 Cap by mouth three (3) times daily. (Patient not taking: Reported on 8/22/2018) 90 Cap 1    naproxen sodium 220 mg cap Take  by mouth. No Known Allergies       PHYSICAL EXAMINATION    Visit Vitals    /88    Pulse 68    Resp 14    Ht 5' 2\" (1.575 m)    Wt 61.3 kg (135 lb 3.2 oz)    LMP  (LMP Unknown)    BMI 24.73 kg/m2       CONSTITUTIONAL: NAD, A&O x 3  SENSATION: Intact to light touch throughout  RANGE OF MOTION: The patient has full passive range of motion in all four extremities.   MOTOR:  Straight Leg Raise: Negative, bilateral               Hip Flex Knee Ext Knee Flex Ankle DF GTE Ankle PF Tone   Right +4/5 +4/5 +4/5 +4/5 +4/5 +4/5 +4/5   Left +4/5 +4/5 +4/5 +4/5 +4/5 +4/5 +4/5       ASSESSMENT   Diagnoses and all orders for this visit:    1. Spondylosis of lumbar region without myelopathy or radiculopathy  -     SCHEDULE SURGERY    2. DDD (degenerative disc disease), lumbar  -     SCHEDULE SURGERY    3. Lumbar radiculopathy  -     SCHEDULE SURGERY    4. HNP (herniated nucleus pulposus), lumbar  -     SCHEDULE SURGERY          IMPRESSION AND PLAN:  Patient wishes to proceed with blocks. I will order L4/5 epidural. We discussed the possibility of setting her up for an EMG of the BLE in the future if the injection does not alleviate her symptoms, as there is no pathology to account for her radicular symptoms on her lumbar MRI. Patient is neurologically intact. I recommend she continue with her HEP daily. Patient is not interested in surgical intervention or neuropathic pain medications at this time. I will see the patient back following blocks. Written by Lexie Cardoso, as dictated by Jeremi Aiken MD  I examined the patient, reviewed and agree with the note.

## 2018-09-05 DIAGNOSIS — M47.816 SPONDYLOSIS OF LUMBAR REGION WITHOUT MYELOPATHY OR RADICULOPATHY: ICD-10-CM

## 2018-09-05 DIAGNOSIS — M51.36 DDD (DEGENERATIVE DISC DISEASE), LUMBAR: ICD-10-CM

## 2018-09-05 DIAGNOSIS — M54.16 LUMBAR RADICULOPATHY: ICD-10-CM

## 2018-09-17 ENCOUNTER — OFFICE VISIT (OUTPATIENT)
Dept: ORTHOPEDIC SURGERY | Age: 63
End: 2018-09-17

## 2018-09-17 VITALS
DIASTOLIC BLOOD PRESSURE: 88 MMHG | HEIGHT: 62 IN | SYSTOLIC BLOOD PRESSURE: 124 MMHG | OXYGEN SATURATION: 97 % | WEIGHT: 136 LBS | BODY MASS INDEX: 25.03 KG/M2 | HEART RATE: 75 BPM

## 2018-09-17 DIAGNOSIS — M51.36 DDD (DEGENERATIVE DISC DISEASE), LUMBAR: Primary | ICD-10-CM

## 2018-09-17 DIAGNOSIS — M54.16 LUMBAR RADICULOPATHY: ICD-10-CM

## 2018-09-17 RX ORDER — DULOXETIN HYDROCHLORIDE 20 MG/1
20 CAPSULE, DELAYED RELEASE ORAL DAILY
Qty: 30 CAP | Refills: 1 | Status: SHIPPED | OUTPATIENT
Start: 2018-09-17 | End: 2018-11-19 | Stop reason: DRUGHIGH

## 2018-09-17 NOTE — PROGRESS NOTES
[facility] 
[address], [address] [place]  Phone: [phone] Fax: [phone] PROGRESS NOTE HISTORY OF PRESENT ILLNESS: 
The patient is a 66 y.o. female and was seen today for follow up of progressive low back pain extending into the BLE (L>R) in an L5 distribution to the ankle in the LLE and in a similar distribution to the mid-thigh in the RLE x 1+ year. Pt did experience symptoms extending into the left foot on a single occasion. Low back pain > extremity symptoms. No specific injury/trauma. 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 Pt self-treats with Naproxen and Ibuprofen. She noted no significant improvement with MDP. She did not tolerate increased Neurontin 300 mg TID due to lethargy. Pt has completed PT. Pt denies h/o lumbar spinal surgery/blocks. Pt denies change in bowel or bladder habits. Pt denies fever, weight loss, or skin changes. Pt denies h/o DM, gastric bypass, stomach ulcers or bleeding disorders. The patient is RHD. Preliminary reading of lumbar plain films revealed mild disc space narrowing at L1-2, L2-3, L4-5, L5-S1. Small anterior osteophytes noted throughout the lumbar spine. No acute pathology identified. Lumbar spine MRI dated 7/30/18 reviewed. Per report, multilevel degenerative joint and disc disease with degenerative facet arthropathy at L3/4 and L4/5. This is most significant at L5/S1 where there is moderate discogenic marrow edema. There is also a ventral protruding disc at L5/S1. Posterior disc protrusions/herniations at L1/2 through L5/S1 encroaching upon the ventral thecal sac without significant level spinal canal stenosis. Foraminal narrowing at L2-L5/S1 without nerve root impingement. Upon review myself, there appears to be an annular tear on the left at L4.  At her last clinical appointment, patient wished to proceed with blocks. I ordered an L4/5 epidural. We discussed the possibility of setting her up for an EMG of the BLE in the future if the injection did not alleviate her symptoms, as there was no pathology to account for her radicular symptoms on her lumbar MRI. The patient returns today with pain location and distribution remain unchanged. She rates her pain 2/10, a decrease from her last visit (5-7/10). Patient continues with her HEP daily. Pt underwent an L4/5 epidural on 8/28/18 with significant relief x 1 week, and some continued relief.  reviewed. Body mass index is 24.87 kg/(m^2). PCP: Susanna Ford MD 
 
 
History reviewed. No pertinent past medical history. Social History Social History  Marital status:  Spouse name: N/A  
 Number of children: N/A  
 Years of education: N/A Occupational History  Not on file. Social History Main Topics  Smoking status: Never Smoker  Smokeless tobacco: Never Used  Alcohol use No  
 Drug use: No  
 Sexual activity: Not on file Other Topics Concern  Not on file Social History Narrative Current Outpatient Prescriptions Medication Sig Dispense Refill  DULoxetine (CYMBALTA) 20 mg capsule Take 1 Cap by mouth daily. Indications: NEUROPATHIC PAIN 30 Cap 1  
 aspirin-acetaminophen-caffeine (EXCEDRIN ES) 250-250-65 mg per tablet Take 1 Tab by mouth.  naproxen sodium 220 mg cap Take  by mouth.  ibuprofen 200 mg cap Take  by mouth.  gabapentin (NEURONTIN) 300 mg capsule Take 1 Cap by mouth three (3) times daily. (Patient not taking: Reported on 8/22/2018) 90 Cap 1  
 gabapentin (NEURONTIN) 100 mg capsule Take 1 Cap by mouth three (3) times daily. (Patient not taking: Reported on 8/22/2018) 90 Cap 1 No Known Allergies PHYSICAL EXAMINATION Visit Vitals  /88  Pulse 75  Ht 5' 2\" (1.575 m)  Wt 61.7 kg (136 lb)  LMP  (LMP Unknown)  SpO2 97%  BMI 24.87 kg/m2 CONSTITUTIONAL: NAD, A&O x 3 SENSATION: Intact to light touch throughout RANGE OF MOTION: The patient has full passive range of motion in all four extremities. MOTOR: 
Straight Leg Raise: Negative, bilateral 
 
         
 Hip Flex Knee Ext Knee Flex Ankle DF GTE Ankle PF Tone Right +4/5 +4/5 +4/5 +4/5 +4/5 +4/5 +4/5 Left +4/5 +4/5 +4/5 +4/5 +4/5 +4/5 +4/5 ASSESSMENT Diagnoses and all orders for this visit: 1. DDD (degenerative disc disease), lumbar -     DULoxetine (CYMBALTA) 20 mg capsule; Take 1 Cap by mouth daily. Indications: NEUROPATHIC PAIN 2. Lumbar radiculopathy 
-     DULoxetine (CYMBALTA) 20 mg capsule; Take 1 Cap by mouth daily. Indications: NEUROPATHIC PAIN IMPRESSION AND PLAN: 
Patient is s/p an L4/5 epidural on 8/28/18 noting significant relief x 1 week and some continued relief. I will try her on Cymbalta 20 mg daily. The risks, benefits, and potential side effects of this medication were discussed. Patient understands and wishes to proceed. Patient advised to call the office if intolerant to new medication. Patient is neurologically intact. I recommend she continue with her HEP daily. Patient is not interested in surgical intervention or additional lumbar blocks at this time. I will see the patient back in 1 month's time or earlier if needed. Written by Lexy Henriquez, as dictated by Jagjit Cordvoa MD 
I examined the patient, reviewed and agree with the note.

## 2018-09-17 NOTE — MR AVS SNAPSHOT
[address] [facility] 
 
 
 [address] [facility] 
[phone] Patient: Clarence Or MRN: [id number] HCI:3/4/8838 Visit Information Date & Time Provider Department Dept. Phone Encounter #  
 9/17/2018  3:20 PM Lanie Dalton MD [facility] [place] [phone] Follow-up Instructions Return in about 4 weeks (around 10/15/2018). Upcoming Health Maintenance Date Due Hepatitis C Screening 1955 DTaP/Tdap/Td series (1 - Tdap) 2/6/1978 PAP AKA CERVICAL CYTOLOGY 2/6/1978 BREAST CANCER SCRN MAMMOGRAM 2/6/2007 FOBT Q 1 YEAR AGE 50-75 2/6/2007 ZOSTER VACCINE AGE 60> 12/6/2016 Influenza Age 5 to Adult 8/1/2018 Allergies as of 9/17/2018  Review Complete On: 9/17/2018 By: Lanie Dalton MD  
 No Known Allergies Current Immunizations  Never Reviewed No immunizations on file. Not reviewed this visit You Were Diagnosed With   
  
 Codes Comments DDD (degenerative disc disease), lumbar    -  Primary ICD-10-CM: M51.36 
ICD-9-CM: 722.52 Lumbar radiculopathy     ICD-10-CM: M54.16 
ICD-9-CM: 724.4 Vitals BP Pulse Height(growth percentile) Weight(growth percentile) LMP SpO2  
 124/88 75 5' 2\" (1.575 m) 136 lb (61.7 kg) (LMP Unknown) 97% BMI OB Status Smoking Status 24.87 kg/m2 Postmenopausal Never Smoker BMI and BSA Data Body Mass Index Body Surface Area  
 24.87 kg/m 2 1.64 m 2 Preferred Pharmacy Pharmacy Name Phone [facility], [address] [place] [phone] Your Updated Medication List  
  
   
This list is accurate as of 9/17/18  4:50 PM.  Always use your most recent med list.  
  
  
  
  
 aspirin-acetaminophen-caffeine 250-250-65 mg per tablet Commonly known as:  EXCEDRIN ES Take 1 Tab by mouth. DULoxetine 20 mg capsule Commonly known as:  CYMBALTA Take 1 Cap by mouth daily. Indications: NEUROPATHIC PAIN  
  
 * gabapentin 100 mg capsule Commonly known as:  NEURONTIN Take 1 Cap by mouth three (3) times daily. * gabapentin 300 mg capsule Commonly known as:  NEURONTIN Take 1 Cap by mouth three (3) times daily. ibuprofen 200 mg Cap Take  by mouth. naproxen sodium 220 mg Cap Take  by mouth. * Notice: This list has 2 medication(s) that are the same as other medications prescribed for you. Read the directions carefully, and ask your doctor or other care provider to review them with you. Prescriptions Sent to Pharmacy Refills DULoxetine (CYMBALTA) 20 mg capsule 1 Sig: Take 1 Cap by mouth daily. Indications: NEUROPATHIC PAIN Class: Normal  
 Pharmacy: [address] [facility], [address] [facility] #: [phone] Route: Oral  
  
Follow-up Instructions Return in about 4 weeks (around 10/15/2018). Introducing [facility]! Janae Bush introduces Proteopure patient portal. Now you can access parts of your medical record, email your doctor's office, and request medication refills online. 1. In your internet browser, go to [email or web address] 2. Click on the First Time User? Click Here link in the Sign In box. You will see the New Member Sign Up page. 3. Enter your Proteopure Access Code exactly as it appears below. You will not need to use this code after youve completed the sign-up process. If you do not sign up before the expiration date, you must request a new code. · Proteopure Access Code: [email or web address] Expires: 10/23/2018  3:54 PM 
 
4. Enter the last four digits of your Social Security Number (xxxx) and Date of Birth (mm/dd/yyyy) as indicated and click Submit. You will be taken to the next sign-up page. 5. Create a Proteopure ID. This will be your Proteopure login ID and cannot be changed, so think of one that is secure and easy to remember. 6. Create a [employer] password. You can change your password at any time. 7. Enter your Password Reset Question and Answer. This can be used at a later time if you forget your password. 8. Enter your e-mail address. You will receive e-mail notification when new information is available in [address] 9. Click Sign Up. You can now view and download portions of your medical record. 10. Click the Download Summary menu link to download a portable copy of your medical information. If you have questions, please visit the Frequently Asked Questions section of the [employer] website. Remember, [employer]entSpring is NOT to be used for urgent needs. For medical emergencies, dial 911. Now available from your iPhone and Android! Please provide this summary of care documentation to your next provider. Your primary care clinician is listed as Estiven Pinzon. If you have any questions after today's visit, please call [phone].

## 2018-10-22 ENCOUNTER — OFFICE VISIT (OUTPATIENT)
Dept: ORTHOPEDIC SURGERY | Age: 63
End: 2018-10-22

## 2018-10-22 VITALS
SYSTOLIC BLOOD PRESSURE: 115 MMHG | WEIGHT: 132 LBS | DIASTOLIC BLOOD PRESSURE: 86 MMHG | RESPIRATION RATE: 14 BRPM | HEIGHT: 62 IN | BODY MASS INDEX: 24.29 KG/M2 | HEART RATE: 72 BPM

## 2018-10-22 DIAGNOSIS — M54.16 LUMBAR RADICULOPATHY: ICD-10-CM

## 2018-10-22 DIAGNOSIS — M51.36 DDD (DEGENERATIVE DISC DISEASE), LUMBAR: Primary | ICD-10-CM

## 2018-10-22 RX ORDER — DULOXETIN HYDROCHLORIDE 30 MG/1
30 CAPSULE, DELAYED RELEASE ORAL DAILY
Qty: 30 CAP | Refills: 1 | Status: SHIPPED | OUTPATIENT
Start: 2018-10-22 | End: 2018-12-17 | Stop reason: DRUGHIGH

## 2018-10-22 NOTE — PROGRESS NOTES
[facility]  [address], [place] [address]  Phone: [phone]  Fax: [phone]        PROGRESS NOTE      HISTORY OF PRESENT ILLNESS:  The patient is a 66 y.o. female and was seen today for follow up of progressive low back pain extending into the BLE (L>R) in an L5 distribution to the ankle in the LLE and in a similar distribution to the mid-thigh in the RLE x 1+ year. Pt did experience symptoms extending into the left foot on a single occasion. Low back pain > extremity symptoms. No specific injury/trauma. 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 Pt self-treats with Naproxen and Ibuprofen. She noted no significant improvement with MDP. She did not tolerate increased Neurontin 300 mg TID due to lethargy. Pt has completed PT. Pt denies h/o lumbar spinal surgery. Pt underwent an L4/5 epidural on 8/28/18 with significant relief x 1 week, and some continued relief. Pt denies change in bowel or bladder habits. Pt denies fever, weight loss, or skin changes. Pt denies h/o DM, gastric bypass, stomach ulcers or bleeding disorders. The patient is RHD. Preliminary reading of lumbar plain films revealed mild disc space narrowing at L1-2, L2-3, L4-5, L5-S1. Small anterior osteophytes noted throughout the lumbar spine. No acute pathology identified. Lumbar spine MRI dated 7/30/18 reviewed. Per report, multilevel degenerative joint and disc disease with degenerative facet arthropathy at L3/4 and L4/5. This is most significant at L5/S1 where there is moderate discogenic marrow edema. There is also a ventral protruding disc at L5/S1. Posterior disc protrusions/herniations at L1/2 through L5/S1 encroaching upon the ventral thecal sac without significant level spinal canal stenosis. Foraminal narrowing at L2-L5/S1 without nerve root impingement.  Upon review myself, there appears to be an annular tear on the left at L4. At her last clinical appointment, patient was s/p an L4/5 epidural on 8/28/18 noting significant relief x 1 week and some continued relief. I tried her on Cymbalta 20 mg daily. The patient returns today with pain location and distribution remain unchanged. Her primary complaint is her low back pain/stiffness in the mornings. She rates her pain 1/10, a slight decrease from her last visit (2/10). She is tolerating Cymbalta 20 mg daily with good relief. Patient continues with her HEP daily. Pt denies change in bowel or bladder habits.  reviewed. Body mass index is 24.14 kg/m². PCP: Hunter Suero MD      History reviewed. No pertinent past medical history. Social History     Socioeconomic History    Marital status:      Spouse name: Not on file    Number of children: Not on file    Years of education: Not on file    Highest education level: Not on file   Social Needs    Financial resource strain: Not on file    Food insecurity - worry: Not on file    Food insecurity - inability: Not on file    Transportation needs - medical: Not on file   [employer] needs - non-medical: Not on file   Occupational History    Not on file   Tobacco Use    Smoking status: Never Smoker    Smokeless tobacco: Never Used   Substance and Sexual Activity    Alcohol use: No    Drug use: No    Sexual activity: Not on file   Other Topics Concern    Not on file   Social History Narrative    Not on file       Current Outpatient Medications   Medication Sig Dispense Refill    DULoxetine (CYMBALTA) 20 mg capsule Take 1 Cap by mouth daily. Indications: NEUROPATHIC PAIN 30 Cap 1    aspirin-acetaminophen-caffeine (EXCEDRIN ES) 250-250-65 mg per tablet Take 1 Tab by mouth.  gabapentin (NEURONTIN) 300 mg capsule Take 1 Cap by mouth three (3) times daily.  (Patient not taking: Reported on 8/22/2018) 90 Cap 1    gabapentin (NEURONTIN) 100 mg capsule Take 1 Cap by mouth three (3) times daily. (Patient not taking: Reported on 8/22/2018) 90 Cap 1    naproxen sodium 220 mg cap Take  by mouth.  ibuprofen 200 mg cap Take  by mouth. No Known Allergies       PHYSICAL EXAMINATION    Visit Vitals  /86   Pulse 72   Resp 14   Ht 5' 2\" (1.575 m)   Wt 132 lb (59.9 kg)   LMP  (LMP Unknown)   BMI 24.14 kg/m²       CONSTITUTIONAL: NAD, A&O x 3  SENSATION: Intact to light touch throughout  RANGE OF MOTION: The patient has full passive range of motion in all four extremities. MOTOR:  Straight Leg Raise: Negative, bilateral               Hip Flex Knee Ext Knee Flex Ankle DF GTE Ankle PF Tone   Right +4/5 +4/5 +4/5 +4/5 +4/5 +4/5 +4/5   Left +4/5 +4/5 +4/5 +4/5 +4/5 +4/5 +4/5       ASSESSMENT   Diagnoses and all orders for this visit:    1. DDD (degenerative disc disease), lumbar    2. Lumbar radiculopathy          IMPRESSION AND PLAN:  I will increase her Cymbalta to 30 mg daily. Patient advised to call the office if intolerant to higher dose. Patient is neurologically intact. I recommend she continue with her HEP daily. I will see the patient back in 1 month's time or earlier if needed. Written by Radha Qiu, as dictated by Rosa Valderrama MD  I examined the patient, reviewed and agree with the note.

## 2018-11-19 ENCOUNTER — OFFICE VISIT (OUTPATIENT)
Dept: ORTHOPEDIC SURGERY | Age: 63
End: 2018-11-19

## 2018-11-19 VITALS
DIASTOLIC BLOOD PRESSURE: 70 MMHG | HEIGHT: 62 IN | WEIGHT: 130 LBS | BODY MASS INDEX: 23.92 KG/M2 | TEMPERATURE: 98.4 F | OXYGEN SATURATION: 96 % | HEART RATE: 66 BPM | SYSTOLIC BLOOD PRESSURE: 113 MMHG

## 2018-11-19 DIAGNOSIS — M54.16 LUMBAR RADICULOPATHY: Primary | ICD-10-CM

## 2018-11-19 DIAGNOSIS — M51.36 DDD (DEGENERATIVE DISC DISEASE), LUMBAR: ICD-10-CM

## 2018-11-19 RX ORDER — DULOXETIN HYDROCHLORIDE 60 MG/1
60 CAPSULE, DELAYED RELEASE ORAL DAILY
Qty: 30 CAP | Refills: 1 | Status: SHIPPED | OUTPATIENT
Start: 2018-11-19 | End: 2018-12-17 | Stop reason: SDUPTHER

## 2018-11-19 NOTE — PROGRESS NOTES
[facility] 
[address], [address]  Phone: [phone] Fax: [phone] PROGRESS NOTE HISTORY OF PRESENT ILLNESS: 
The patient is a 66 y.o. female and was seen today for follow up of progressive low back pain extending into the BLE (L>R) in an L5 distribution to the ankle in the LLE and in a similar distribution to the mid-thigh in the RLE x 1+ year. Pt did experience symptoms extending into the left foot on a single occasion. Low back pain > extremity symptoms. No specific injury/trauma. 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 Pt self-treats with Naproxen and Ibuprofen. She noted no significant improvement with MDP. She did not tolerate increased Neurontin 300 mg TID due to lethargy. Pt has completed PT. Pt denies h/o lumbar spinal surgery. Pt underwent an L4/5 epidural on 8/28/18 with significant relief x 1 week, and some continued relief. Pt denies change in bowel or bladder habits. Pt denies fever, weight loss, or skin changes. Pt denies h/o DM, gastric bypass, stomach ulcers or bleeding disorders. The patient is RHD. Preliminary reading of lumbar plain films revealed mild disc space narrowing at L1-2, L2-3, L4-5, L5-S1. Small anterior osteophytes noted throughout the lumbar spine. No acute pathology identified. Lumbar spine MRI dated 7/30/18 reviewed. Per report, multilevel degenerative joint and disc disease with degenerative facet arthropathy at L3/4 and L4/5. This is most significant at L5/S1 where there is moderate discogenic marrow edema. There is also a ventral protruding disc at L5/S1. Posterior disc protrusions/herniations at L1/2 through L5/S1 encroaching upon the ventral thecal sac without significant level spinal canal stenosis. Foraminal narrowing at L2-L5/S1 without nerve root impingement.  Upon review myself, there appears to be an annular tear on the left at L4. At her last clinical appointment, I increased her Cymbalta to 30 mg daily. The patient returns today with pain location and distribution remain unchanged. She rates her pain 0-2/10, consistent with her last visit (1/10). She is tolerating increased Cymbalta 30 mg daily with good additional relief. She reports that overall she is doing well. Patient continues with her HEP daily.  reviewed. Body mass index is 23.78 kg/m². PCP: Wayne Chacon MD 
 
 
No past medical history on file. Social History Socioeconomic History  Marital status:  Spouse name: Not on file  Number of children: Not on file  Years of education: Not on file  Highest education level: Not on file Social Needs  Financial resource strain: Not on file  Food insecurity - worry: Not on file  Food insecurity - inability: Not on file  Transportation needs - medical: Not on file  Transportation needs - non-medical: Not on file Occupational History  Not on file Tobacco Use  Smoking status: Never Smoker  Smokeless tobacco: Never Used Substance and Sexual Activity  Alcohol use: No  
 Drug use: No  
 Sexual activity: Not on file Other Topics Concern  Not on file Social History Narrative  Not on file Current Outpatient Medications Medication Sig Dispense Refill  DULoxetine (CYMBALTA) 30 mg capsule Take 1 Cap by mouth daily. 30 Cap 1  
 DULoxetine (CYMBALTA) 20 mg capsule Take 1 Cap by mouth daily. Indications: NEUROPATHIC PAIN 30 Cap 1  
 aspirin-acetaminophen-caffeine (EXCEDRIN ES) 250-250-65 mg per tablet Take 1 Tab by mouth.  gabapentin (NEURONTIN) 300 mg capsule Take 1 Cap by mouth three (3) times daily.  (Patient not taking: Reported on 8/22/2018) 90 Cap 1  
 gabapentin (NEURONTIN) 100 mg capsule Take 1 Cap by mouth three (3) times daily. (Patient not taking: Reported on 8/22/2018) 90 Cap 1  
 naproxen sodium 220 mg cap Take  by mouth.  ibuprofen 200 mg cap Take  by mouth. No Known Allergies PHYSICAL EXAMINATION Visit Vitals /70 Pulse 66 Temp 98.4 °F (36.9 °C) (Oral) Ht 5' 2\" (1.575 m) Wt 130 lb (59 kg) LMP  (LMP Unknown) SpO2 96% BMI 23.78 kg/m² CONSTITUTIONAL: NAD, A&O x 3 SENSATION: Intact to light touch throughout RANGE OF MOTION: The patient has full passive range of motion in all four extremities. MOTOR: 
Straight Leg Raise: Negative, bilateral 
 
         
 Hip Flex Knee Ext Knee Flex Ankle DF GTE Ankle PF Tone Right +4/5 +4/5 +4/5 +4/5 +4/5 +4/5 +4/5 Left +4/5 +4/5 +4/5 +4/5 +4/5 +4/5 +4/5 ASSESSMENT Diagnoses and all orders for this visit: 1. Lumbar radiculopathy 2. DDD (degenerative disc disease), lumbar 3. BMI 24.0-24.9, adult IMPRESSION AND PLAN: 
I will increase her Cymbalta to 60 mg daily. Patient advised to call the office if intolerant to higher dose. Patient is neurologically intact. I recommend she continue with her HEP daily. Patient is not interested in surgical intervention or lumbar blocks at this time. I will see the patient back in 1 month's time or earlier if needed. Written by Stefani Waggoner, as dictated by Johnathan Luciano MD 
I examined the patient, reviewed and agree with the note.

## 2018-12-17 ENCOUNTER — OFFICE VISIT (OUTPATIENT)
Dept: ORTHOPEDIC SURGERY | Age: 63
End: 2018-12-17

## 2018-12-17 VITALS
TEMPERATURE: 97.6 F | HEART RATE: 65 BPM | BODY MASS INDEX: 23.92 KG/M2 | OXYGEN SATURATION: 97 % | HEIGHT: 62 IN | RESPIRATION RATE: 16 BRPM | SYSTOLIC BLOOD PRESSURE: 113 MMHG | WEIGHT: 130 LBS | DIASTOLIC BLOOD PRESSURE: 74 MMHG

## 2018-12-17 DIAGNOSIS — M54.16 LUMBAR RADICULOPATHY: Primary | ICD-10-CM

## 2018-12-17 DIAGNOSIS — M51.36 DDD (DEGENERATIVE DISC DISEASE), LUMBAR: ICD-10-CM

## 2018-12-17 RX ORDER — DULOXETIN HYDROCHLORIDE 30 MG/1
30 CAPSULE, DELAYED RELEASE ORAL DAILY
Qty: 30 CAP | Refills: 2 | Status: SHIPPED | OUTPATIENT
Start: 2018-12-17 | End: 2020-04-14 | Stop reason: SDUPTHER

## 2018-12-17 RX ORDER — DULOXETIN HYDROCHLORIDE 60 MG/1
60 CAPSULE, DELAYED RELEASE ORAL DAILY
Qty: 30 CAP | Refills: 2 | Status: SHIPPED | OUTPATIENT
Start: 2018-12-17 | End: 2018-12-17 | Stop reason: SDUPTHER

## 2018-12-17 NOTE — PROGRESS NOTES
[facility]  [address], [place]   Phone: [phone]  Fax: [phone]        PROGRESS NOTE      HISTORY OF PRESENT ILLNESS:  The patient is a 66 y.o. female and was seen today for follow up of progressive low back pain extending into the BLE (L>R) in an L5 distribution to the ankle in the LLE and in a similar distribution to the mid-thigh in the RLE x 1+ year. Pt did experience symptoms extending into the left foot on a single occasion. Low back pain > extremity symptoms. No specific injury/trauma. 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 Pt self-treats with Naproxen and Ibuprofen. She noted no significant improvement with MDP. She did not tolerate increased Neurontin 300 mg TID due to lethargy. Pt has completed PT. Pt denies h/o lumbar spinal surgery. Pt underwent an L4/5 epidural on 8/28/18 with significant relief x 1 week, and some continued relief. Pt denies change in bowel or bladder habits. Pt denies fever, weight loss, or skin changes. Pt denies h/o DM, gastric bypass, stomach ulcers or bleeding disorders. The patient is RHD. Preliminary reading of lumbar plain films revealed mild disc space narrowing at L1-2, L2-3, L4-5, L5-S1. Small anterior osteophytes noted throughout the lumbar spine. No acute pathology identified. Lumbar spine MRI dated 7/30/18 reviewed. Per report, multilevel degenerative joint and disc disease with degenerative facet arthropathy at L3/4 and L4/5. This is most significant at L5/S1 where there is moderate discogenic marrow edema. There is also a ventral protruding disc at L5/S1. Posterior disc protrusions/herniations at L1/2 through L5/S1 encroaching upon the ventral thecal sac without significant level spinal canal stenosis. Foraminal narrowing at L2-L5/S1 without nerve root impingement.  Upon review myself, there appears to be an annular tear on the left at L4. At her last clinical appointment, I increased her Cymbalta to 60 mg daily. The patient returns today with pain location and distribution remain unchanged. She rates her pain 1/10, consistent with her last visit (0-2/10). She is tolerating increased Cymbalta 60 mg daily with no additional relief. She reports that overall she is doing well. Patient continues with her HEP daily.  reviewed. Body mass index is 23.78 kg/m². PCP: Marilyn Lui MD      No past medical history on file. Social History     Socioeconomic History    Marital status:      Spouse name: Not on file    Number of children: Not on file    Years of education: Not on file    Highest education level: Not on file   Social Needs    Financial resource strain: Not on file    Food insecurity - worry: Not on file    Food insecurity - inability: Not on file    Transportation needs - medical: Not on file   [employer] needs - non-medical: Not on file   Occupational History    Not on file   Tobacco Use    Smoking status: Never Smoker    Smokeless tobacco: Never Used   Substance and Sexual Activity    Alcohol use: No    Drug use: No    Sexual activity: Not on file   Other Topics Concern    Not on file   Social History Narrative    Not on file       Current Outpatient Medications   Medication Sig Dispense Refill    DULoxetine (CYMBALTA) 60 mg capsule Take 1 Cap by mouth daily. 30 Cap 1    DULoxetine (CYMBALTA) 30 mg capsule Take 1 Cap by mouth daily. 30 Cap 1    aspirin-acetaminophen-caffeine (EXCEDRIN ES) 250-250-65 mg per tablet Take 1 Tab by mouth.  gabapentin (NEURONTIN) 300 mg capsule Take 1 Cap by mouth three (3) times daily. (Patient not taking: Reported on 8/22/2018) 90 Cap 1    gabapentin (NEURONTIN) 100 mg capsule Take 1 Cap by mouth three (3) times daily. (Patient not taking: Reported on 8/22/2018) 90 Cap 1    naproxen sodium 220 mg cap Take  by mouth.       ibuprofen 200 mg cap Take by mouth. No Known Allergies       PHYSICAL EXAMINATION    Visit Vitals  /74   Pulse 65   Temp 97.6 °F (36.4 °C)   Resp 16   Ht 5' 2\" (1.575 m)   Wt 130 lb (59 kg)   LMP  (LMP Unknown)   SpO2 97%   BMI 23.78 kg/m²       CONSTITUTIONAL: NAD, A&O x 3  SENSATION: Intact to light touch throughout  RANGE OF MOTION: The patient has full passive range of motion in all four extremities. MOTOR:  Straight Leg Raise: Negative, bilateral               Hip Flex Knee Ext Knee Flex Ankle DF GTE Ankle PF Tone   Right +4/5 +4/5 +4/5 +4/5 +4/5 +4/5 +4/5   Left +4/5 +4/5 +4/5 +4/5 +4/5 +4/5 +4/5       ASSESSMENT   Diagnoses and all orders for this visit:    1. Lumbar radiculopathy    2. DDD (degenerative disc disease), lumbar          IMPRESSION AND PLAN:  She should decrease her Cymbalta back to 30 mg daily. I provided her with refills. Patient is neurologically intact. I recommend she continue with her HEP daily. Patient is not interested in surgical intervention or lumbar blocks at this time. I will see the patient back in 3 month's time or earlier if needed. Written by Dominic Perdomo, as dictated by Nancy Draper MD  I examined the patient, reviewed and agree with the note.

## 2019-03-18 ENCOUNTER — OFFICE VISIT (OUTPATIENT)
Dept: ORTHOPEDIC SURGERY | Age: 64
End: 2019-03-18

## 2019-03-18 VITALS
HEIGHT: 62 IN | WEIGHT: 136 LBS | SYSTOLIC BLOOD PRESSURE: 117 MMHG | OXYGEN SATURATION: 97 % | DIASTOLIC BLOOD PRESSURE: 80 MMHG | BODY MASS INDEX: 25.03 KG/M2 | HEART RATE: 69 BPM

## 2019-03-18 DIAGNOSIS — M54.16 LUMBAR RADICULOPATHY: Primary | ICD-10-CM

## 2019-03-18 DIAGNOSIS — M51.36 DDD (DEGENERATIVE DISC DISEASE), LUMBAR: ICD-10-CM

## 2019-03-18 RX ORDER — DULOXETIN HYDROCHLORIDE 30 MG/1
30 CAPSULE, DELAYED RELEASE ORAL DAILY
Qty: 30 CAP | Refills: 5 | Status: SHIPPED | OUTPATIENT
Start: 2019-03-18 | End: 2020-04-14 | Stop reason: SDUPTHER

## 2019-03-18 NOTE — PROGRESS NOTES
Chief Complaint   Patient presents with    Back Pain     low back pain     1. Have you been to the ER, urgent care clinic since your last visit? Hospitalized since your last visit? No    2. Have you seen or consulted any other health care providers outside of the [address] [place] [address] since your last visit? Include any pap smears or colon screening.  No

## 2019-03-18 NOTE — PROGRESS NOTES
[facility]  [address], [address]  Phone: [phone]  Fax: [phone]        PROGRESS NOTE      HISTORY OF PRESENT ILLNESS:  The patient is a 60 y.o. female and was seen today for follow up of progressive low back pain extending into the BLE (L>R) in an L5 distribution to the ankle in the LLE and in a similar distribution to the mid-thigh in the RLE x 1+ year. Pt did experience symptoms extending into the left foot on a single occasion. Low back pain > lower extremity symptoms. No specific injury/trauma. 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 Pt self-treats with Naproxen and Ibuprofen. She noted no significant improvement with MDP. She did not tolerate increased Neurontin 300 mg TID due to lethargy. Pt has completed PT. Pt denies h/o lumbar spinal surgery. Pt underwent an L4/5 epidural on 8/28/18 with significant relief x 1 week, and some continued relief. Pt denies change in bowel or bladder habits. Pt denies fever, weight loss, or skin changes. Pt denies h/o DM, gastric bypass, stomach ulcers or bleeding disorders. The patient is RHD. Preliminary reading of lumbar plain films revealed mild disc space narrowing at L1-2, L2-3, L4-5, L5-S1. Small anterior osteophytes noted throughout the lumbar spine. No acute pathology identified. Lumbar spine MRI dated 7/30/18 reviewed. Per report, multilevel degenerative joint and disc disease with degenerative facet arthropathy at L3/4 and L4/5. This is most significant at L5/S1 where there is moderate discogenic marrow edema. There is also a ventral protruding disc at L5/S1. Posterior disc protrusions/herniations at L1/2 through L5/S1 encroaching upon the ventral thecal sac without significant level spinal canal stenosis. Foraminal narrowing at L2-L5/S1 without nerve root impingement.  Upon review myself, there appears to be an annular tear on the left at L4. At her last clinical appointment, I decreased her Cymbalta back to 30 mg daily. I provided her with refills. I recommended she continue with her HEP daily. Patient was not interested in surgical intervention or lumbar blocks. The patient returns today with pain location and distribution remain unchanged. She continues to rate her pain 1/10. Pt is complaint with decreased dose of Cymbalta to 30 mg daily without an increase in pain. She reports she has been taking Ibuprofen 800 mg qam and 800 mg qhs. Pt continues with her HEP daily. Pt denies change in bowel or bladder habits.  reviewed. Body mass index is 24.87 kg/m². PCP: Glenny Perez MD      History reviewed. No pertinent past medical history. Social History     Socioeconomic History    Marital status:      Spouse name: Not on file    Number of children: Not on file    Years of education: Not on file    Highest education level: Not on file   Social Needs    Financial resource strain: Not on file    Food insecurity - worry: Not on file    Food insecurity - inability: Not on file    Transportation needs - medical: Not on file   [employer] [employer] needs - non-medical: Not on file   Occupational History    Not on file   Tobacco Use    Smoking status: Never Smoker    Smokeless tobacco: Never Used   Substance and Sexual Activity    Alcohol use: No    Drug use: No    Sexual activity: Not on file   Other Topics Concern    Not on file   Social History Narrative    Not on file       Current Outpatient Medications   Medication Sig Dispense Refill    DULoxetine (CYMBALTA) 30 mg capsule Take 1 Cap by mouth daily. 30 Cap 2    aspirin-acetaminophen-caffeine (EXCEDRIN ES) 250-250-65 mg per tablet Take 1 Tab by mouth.  gabapentin (NEURONTIN) 300 mg capsule Take 1 Cap by mouth three (3) times daily. 90 Cap 1    gabapentin (NEURONTIN) 100 mg capsule Take 1 Cap by mouth three (3) times daily.  90 Cap 1    naproxen sodium 220 mg cap Take  by mouth.  ibuprofen 200 mg cap Take  by mouth. No Known Allergies       PHYSICAL EXAMINATION    Visit Vitals  /80   Pulse 69   Ht 5' 2\" (1.575 m)   Wt 136 lb (61.7 kg)   LMP  (LMP Unknown)   SpO2 97%   BMI 24.87 kg/m²       CONSTITUTIONAL: NAD, A&O x 3  SENSATION: Intact to light touch throughout  RANGE OF MOTION: The patient has full passive range of motion in all four extremities. MOTOR:  Straight Leg Raise: Negative, bilateral               Hip Flex Knee Ext Knee Flex Ankle DF GTE Ankle PF Tone   Right +4/5 +4/5 +4/5 +4/5 +4/5 +4/5 +4/5   Left +4/5 +4/5 +4/5 +4/5 +4/5 +4/5 +4/5       ASSESSMENT   Diagnoses and all orders for this visit:    1. Lumbar radiculopathy    2. DDD (degenerative disc disease), lumbar          IMPRESSION AND PLAN:  Patient wished to continue her current treatment. I provided her with refills of Cymbalta 30 mg daily. I recommend she continue to complete her HEP daily. I informed the patient about the risks of taking NSAIDs chronically. I have advised patient to limit her intake of Ibuprofen and other NSAIDs to prn. Patient is not interested in surgical intervention or lumbar blocks at this time. Patient is neurologically intact. I will see the patient back in 6 month's time or earlier if needed. Written by Lali Smith, as dictated by Supriya Horne MD  I examined the patient, reviewed and agree with the note.

## 2019-09-18 ENCOUNTER — OFFICE VISIT (OUTPATIENT)
Dept: ORTHOPEDIC SURGERY | Age: 64
End: 2019-09-18

## 2019-09-18 VITALS
SYSTOLIC BLOOD PRESSURE: 124 MMHG | DIASTOLIC BLOOD PRESSURE: 83 MMHG | HEIGHT: 62 IN | HEART RATE: 76 BPM | RESPIRATION RATE: 16 BRPM | WEIGHT: 139 LBS | BODY MASS INDEX: 25.58 KG/M2

## 2019-09-18 DIAGNOSIS — M51.36 DDD (DEGENERATIVE DISC DISEASE), LUMBAR: ICD-10-CM

## 2019-09-18 DIAGNOSIS — M54.16 LUMBAR RADICULOPATHY: Primary | ICD-10-CM

## 2019-09-18 RX ORDER — PREGABALIN 75 MG/1
75 CAPSULE ORAL 2 TIMES DAILY
Qty: 60 CAP | Refills: 1 | Status: SHIPPED | OUTPATIENT
Start: 2019-09-18 | End: 2020-04-14

## 2019-09-18 RX ORDER — PREGABALIN 75 MG/1
75 CAPSULE ORAL 2 TIMES DAILY
Qty: 28 CAP | Refills: 0 | Status: SHIPPED | OUTPATIENT
Start: 2019-09-18 | End: 2020-04-14

## 2019-09-18 NOTE — LETTER
9/18/19 Patient: Lydia Grove YOB: 1955 Date of Visit: 9/18/2019 Pilar Davis, [address] [facility] [address] [place] [address] VIA Facsimile: [phone] Dear Pilar Davis MD, Thank you for referring Ms. Roney Fragoso to [address] [place] [address] for evaluation. My notes for this consultation are attached. If you have questions, please do not hesitate to call me. I look forward to following your patient along with you. Sincerely, Palma Walter MD

## 2019-09-18 NOTE — PROGRESS NOTES
[facility]  [address], [address]  Phone: [phone]  Fax: [phone]        PROGRESS NOTE      HISTORY OF PRESENT ILLNESS:  The patient is a 60 y.o. female and was seen today for follow up of progressive low back pain extending into the BLE (L>R) in an L5 distribution to the ankle in the LLE and in a similar distribution to the mid-thigh in the RLE x 1+ year. Pt did experience symptoms extending into the left foot on a single occasion. Low back pain > lower extremity symptoms. No specific injury/trauma. 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 Pt self-treats with Naproxen and Ibuprofen. She noted no significant improvement with MDP. She did not tolerate increased Neurontin 300 mg TID due to lethargy. Pt has completed PT. Pt denies h/o lumbar spinal surgery. Pt underwent an L4/5 epidural on 8/28/18 with significant relief x 1 week, and some continued relief. Pt denies change in bowel or bladder habits. Pt denies fever, weight loss, or skin changes. Pt denies h/o DM, gastric bypass, stomach ulcers or bleeding disorders. The patient is RHD. Preliminary reading of lumbar plain films revealed mild disc space narrowing at L1-2, L2-3, L4-5, L5-S1. Small anterior osteophytes noted throughout the lumbar spine. No acute pathology identified. Lumbar spine MRI dated 7/30/18 reviewed. Per report, multilevel degenerative joint and disc disease with degenerative facet arthropathy at L3/4 and L4/5. This is most significant at L5/S1 where there is moderate discogenic marrow edema. There is also a ventral protruding disc at L5/S1. Posterior disc protrusions/herniations at L1/2 through L5/S1 encroaching upon the ventral thecal sac without significant level spinal canal stenosis. Foraminal narrowing at L2-L5/S1 without nerve root impingement.  Upon review myself, there appears to be an annular tear on the left at L4. At her last clinical appointment, patient wished to continue her current treatment. I provided her with refills of Cymbalta 30 mg daily. I recommended she continue to complete her HEP daily. I informed the patient about the risks of taking NSAIDs chronically. I have advised patient to limit her intake of Ibuprofen and other NSAIDs to prn. Patient was not interested in surgical intervention or lumbar blocks at that time. The patient returns today with pain location and distribution remain unchanged. She continues to rates her pain 1/10. She reports her RLE sxs are occurring less often. She is complaint with Cymbalta 30 mg daily with benefit. She reports some dry mouth with Cymbalta. She has been taking Ibuprofen 800 mg prn. Pt continues with her HEP daily. Pt denies change in bowel or bladder habits.  reviewed. Body mass index is 25.42 kg/m². PCP: Holly eLwis MD      History reviewed. No pertinent past medical history.      Social History     Socioeconomic History    Marital status:      Spouse name: Not on file    Number of children: Not on file    Years of education: Not on file    Highest education level: Not on file   Occupational History    Not on file   Social Needs    Financial resource strain: Not on file    Food insecurity:     Worry: Not on file     Inability: Not on file    Transportation needs:     Medical: Not on file     Non-medical: Not on file   Tobacco Use    Smoking status: Never Smoker    Smokeless tobacco: Never Used   Substance and Sexual Activity    Alcohol use: No    Drug use: No    Sexual activity: Not on file   Lifestyle    Physical activity:     Days per week: Not on file     Minutes per session: Not on file    Stress: Not on file   Relationships    Social connections:     Talks on phone: Not on file     Gets together: Not on file     Attends [employer] service: Not on file     Active member of club or organization: Not on file Attends meetings of clubs or organizations: Not on file     Relationship status: Not on file    Intimate partner violence:     Fear of current or ex partner: Not on file     Emotionally abused: Not on file     Physically abused: Not on file     Forced sexual activity: Not on file   Other Topics Concern    Not on file   Social History Narrative    Not on file       Current Outpatient Medications   Medication Sig Dispense Refill    DULoxetine (CYMBALTA) 30 mg capsule Take 1 Cap by mouth daily. 30 Cap 2    aspirin-acetaminophen-caffeine (EXCEDRIN ES) 250-250-65 mg per tablet Take 1 Tab by mouth.  ibuprofen 200 mg cap Take  by mouth.  DULoxetine (CYMBALTA) 30 mg capsule Take 1 Cap by mouth daily. 30 Cap 5    gabapentin (NEURONTIN) 300 mg capsule Take 1 Cap by mouth three (3) times daily. 90 Cap 1    gabapentin (NEURONTIN) 100 mg capsule Take 1 Cap by mouth three (3) times daily. 90 Cap 1    naproxen sodium 220 mg cap Take  by mouth. No Known Allergies       PHYSICAL EXAMINATION    Visit Vitals  /83   Pulse 76   Resp 16   Ht 5' 2\" (1.575 m)   Wt 139 lb (63 kg)   LMP  (LMP Unknown)   BMI 25.42 kg/m²       CONSTITUTIONAL: NAD, A&O x 3  SENSATION: Intact to light touch throughout  RANGE OF MOTION: The patient has full passive range of motion in all four extremities. MOTOR:  Straight Leg Raise: Negative, bilateral               Hip Flex Knee Ext Knee Flex Ankle DF GTE Ankle PF Tone   Right +4/5 +4/5 +4/5 +4/5 +4/5 +4/5 +4/5   Left +4/5 +4/5 +4/5 +4/5 +4/5 +4/5 +4/5       ASSESSMENT   Diagnoses and all orders for this visit:    1. Lumbar radiculopathy    2. DDD (degenerative disc disease), lumbar    Other orders  -     pregabalin (LYRICA) 75 mg capsule; Take 1 Cap by mouth two (2) times a day. Max Daily Amount: 150 mg.  -     pregabalin (LYRICA) 75 mg capsule; Take 1 Cap by mouth two (2) times a day.  Max Daily Amount: 150 mg.          IMPRESSION AND PLAN:  Patient would like to try a new neuropathic pain medication as Cymbalta has been causing dry mouth. Patient should d/c Cymbalta. I will try her on Lyrica 75 mg BID. The risks, benefits, and potential side effects of this medication were discussed. Patient understands and wishes to proceed. Patient advised to call the office if intolerant to new medication. I recommend she continue to perform her HEP daily. Patient is neurologically intact. I will see the patient back in 1 month's time or earlier if needed. Written by Gerson Bergeron, as dictated by Jack العراقي MD  I examined the patient, reviewed and agree with the note.

## 2019-10-08 ENCOUNTER — TELEPHONE (OUTPATIENT)
Dept: ORTHOPEDIC SURGERY | Age: 64
End: 2019-10-08

## 2019-10-08 NOTE — TELEPHONE ENCOUNTER
Spoke to the patient. She states that she would like to go back to the cymbalta instead of trying Topamax. Please advise.

## 2019-10-08 NOTE — TELEPHONE ENCOUNTER
She has failed gabapentin. Cymbalta caused dry mouth per the note. Would she prefer to try another medication? If so, Topamax would be the next option. Does she have a hx of glaucoma or kidney stones? If not, VO for Topamax 75 mg QHS tapering up, #90, 1 RF.

## 2019-10-08 NOTE — TELEPHONE ENCOUNTER
Patient called to report that she is unable to continue taking Lyrica due to the side effects and she was hoping to go back on previous medication. If approved, please call in to Chaz Peter on file. Patient can be reached at work today, [phone], please ask for patient and they will page her as she doesn't have direct extension.

## 2019-10-08 NOTE — TELEPHONE ENCOUNTER
Patient called wishing to know the status of her previous message please advise patient at [phone]

## 2019-10-09 RX ORDER — DULOXETIN HYDROCHLORIDE 30 MG/1
30 CAPSULE, DELAYED RELEASE ORAL DAILY
Qty: 30 CAP | Refills: 0 | Status: SHIPPED | OUTPATIENT
Start: 2019-10-09 | End: 2020-04-14 | Stop reason: SDUPTHER

## 2019-10-18 ENCOUNTER — OFFICE VISIT (OUTPATIENT)
Dept: ORTHOPEDIC SURGERY | Age: 64
End: 2019-10-18

## 2019-10-18 VITALS
TEMPERATURE: 98.4 F | BODY MASS INDEX: 25.76 KG/M2 | SYSTOLIC BLOOD PRESSURE: 117 MMHG | OXYGEN SATURATION: 98 % | DIASTOLIC BLOOD PRESSURE: 79 MMHG | WEIGHT: 140 LBS | HEIGHT: 62 IN | HEART RATE: 68 BPM | RESPIRATION RATE: 16 BRPM

## 2019-10-18 DIAGNOSIS — M51.36 DDD (DEGENERATIVE DISC DISEASE), LUMBAR: ICD-10-CM

## 2019-10-18 DIAGNOSIS — M54.16 LUMBAR RADICULOPATHY: Primary | ICD-10-CM

## 2019-10-18 RX ORDER — DULOXETIN HYDROCHLORIDE 30 MG/1
30 CAPSULE, DELAYED RELEASE ORAL DAILY
Qty: 30 CAP | Refills: 5 | Status: SHIPPED | OUTPATIENT
Start: 2019-10-18 | End: 2020-04-14 | Stop reason: SDUPTHER

## 2019-10-18 NOTE — PROGRESS NOTES
[facility]  [address], [address]  Phone: [phone]  Fax: [phone]        PROGRESS NOTE      HISTORY OF PRESENT ILLNESS:  The patient is a 60 y.o. female and was seen today for follow up of progressive low back pain extending into the BLE (L>R) in an L5 distribution to the ankle in the LLE and in a similar distribution to the mid-thigh in the RLE x 1+ year. Pt did experience symptoms extending into the left foot on a single occasion. Low back pain > lower extremity symptoms. No specific injury/trauma. 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 Pt self-treats with Naproxen and Ibuprofen. She noted no significant improvement with MDP. She did not tolerate increased Neurontin 300 mg TID due to lethargy. Pt has completed PT. Pt denies h/o lumbar spinal surgery. Pt underwent an L4/5 epidural on 8/28/18 with significant relief x 1 week, and some continued relief. Pt denies change in bowel or bladder habits. Pt denies fever, weight loss, or skin changes. Pt denies h/o DM, gastric bypass, stomach ulcers or bleeding disorders. The patient is RHD. Preliminary reading of lumbar plain films revealed mild disc space narrowing at L1-2, L2-3, L4-5, L5-S1. Small anterior osteophytes noted throughout the lumbar spine. No acute pathology identified. Lumbar spine MRI dated 7/30/18 reviewed. Per report, multilevel degenerative joint and disc disease with degenerative facet arthropathy at L3/4 and L4/5. This is most significant at L5/S1 where there is moderate discogenic marrow edema. There is also a ventral protruding disc at L5/S1. Posterior disc protrusions/herniations at L1/2 through L5/S1 encroaching upon the ventral thecal sac without significant level spinal canal stenosis. Foraminal narrowing at L2-L5/S1 without nerve root impingement.  Upon review myself, there appears to be an annular tear on the left at L4. At her last clinical appointment, patient wanted to try a new neuropathic pain medication as Cymbalta was causing dry mouth. Patient should d/c Cymbalta. I tried her on Lyrica 75 mg BID. I recommended she continue to perform her HEP daily. The patient returns today with low back pain extending into the left hip and left lateral thigh. She continues to rate her pain 1/10. She reports she is not experiencing RLE sxs at this time. Pt did not tolerate Lyrica 75 mg BID due to causing fuzziness. She self-started on Cymbalta 30 mg daily again. She is performing her HEP daily. Pt denies change in bowel or bladder habits.  reviewed. Body mass index is 25.61 kg/m². PCP: Lino Sanchez MD      History reviewed. No pertinent past medical history.      Social History     Socioeconomic History    Marital status:      Spouse name: Not on file    Number of children: Not on file    Years of education: Not on file    Highest education level: Not on file   Occupational History    Not on file   Social Needs    Financial resource strain: Not on file    Food insecurity:     Worry: Not on file     Inability: Not on file    Transportation needs:     Medical: Not on file     Non-medical: Not on file   Tobacco Use    Smoking status: Never Smoker    Smokeless tobacco: Never Used   Substance and Sexual Activity    Alcohol use: No    Drug use: No    Sexual activity: Not on file   Lifestyle    Physical activity:     Days per week: Not on file     Minutes per session: Not on file    Stress: Not on file   Relationships    Social connections:     Talks on phone: Not on file     Gets together: Not on file     Attends [employer] service: Not on file     Active member of club or organization: Not on file     Attends meetings of clubs or organizations: Not on file     Relationship status: Not on file    Intimate partner violence:     Fear of current or ex partner: Not on file     Emotionally abused: Not on file     Physically abused: Not on file     Forced sexual activity: Not on file   Other Topics Concern    Not on file   Social History Narrative    Not on file       Current Outpatient Medications   Medication Sig Dispense Refill    aspirin-acetaminophen-caffeine (EXCEDRIN ES) 250-250-65 mg per tablet Take 1 Tab by mouth.  naproxen sodium 220 mg cap Take  by mouth.  ibuprofen 200 mg cap Take  by mouth.  DULoxetine (CYMBALTA) 30 mg capsule Take 1 Cap by mouth daily. 30 Cap 0    pregabalin (LYRICA) 75 mg capsule Take 1 Cap by mouth two (2) times a day. Max Daily Amount: 150 mg. 28 Cap 0    pregabalin (LYRICA) 75 mg capsule Take 1 Cap by mouth two (2) times a day. Max Daily Amount: 150 mg. 60 Cap 1    DULoxetine (CYMBALTA) 30 mg capsule Take 1 Cap by mouth daily. 30 Cap 5    DULoxetine (CYMBALTA) 30 mg capsule Take 1 Cap by mouth daily. 30 Cap 2    gabapentin (NEURONTIN) 300 mg capsule Take 1 Cap by mouth three (3) times daily. 90 Cap 1    gabapentin (NEURONTIN) 100 mg capsule Take 1 Cap by mouth three (3) times daily. 90 Cap 1       No Known Allergies       PHYSICAL EXAMINATION    Visit Vitals  /79 (BP 1 Location: Left arm, BP Patient Position: Sitting)   Pulse 68   Temp 98.4 °F (36.9 °C) (Oral)   Resp 16   Ht 5' 2\" (1.575 m)   Wt 140 lb (63.5 kg)   LMP  (LMP Unknown)   SpO2 98%   BMI 25.61 kg/m²       CONSTITUTIONAL: NAD, A&O x 3  SENSATION: Intact to light touch throughout  RANGE OF MOTION: The patient has full passive range of motion in all four extremities. MOTOR:  Straight Leg Raise: Negative, bilateral               Hip Flex Knee Ext Knee Flex Ankle DF GTE Ankle PF Tone   Right +4/5 +4/5 +4/5 +4/5 +4/5 +4/5 +4/5   Left +4/5 +4/5 +4/5 +4/5 +4/5 +4/5 +4/5        ASSESSMENT   Diagnoses and all orders for this visit:    1. Lumbar radiculopathy    2. DDD (degenerative disc disease), lumbar    Other orders  -     DULoxetine (CYMBALTA) 30 mg capsule;  Take 1 Cap by mouth daily.          IMPRESSION AND PLAN:  Patient wished to continue her current treatment. I provided her with refills of Cymbalta. I recommend she continue to perform her HEP daily. Patient is neurologically intact. I will see the patient back in 6 month's time or earlier if needed. Written by Elis Yang, as dictated by Adelaide Mitchell MD  I examined the patient, reviewed and agree with the note.

## 2019-10-18 NOTE — LETTER
10/18/19 Patient: Priyanka aHrdy YOB: 1955 Date of Visit: 10/18/2019 Es Rider, [address] [facility] [address] [facility] [address] VIA Facsimile: [phone] Dear Es Rider MD, Thank you for referring Ms. Anais Wheeler to [address] [place] [address] for evaluation. My notes for this consultation are attached. If you have questions, please do not hesitate to call me. I look forward to following your patient along with you. Sincerely, Suly Henry MD

## 2020-04-10 ENCOUNTER — DOCUMENTATION ONLY (OUTPATIENT)
Dept: ORTHOPEDIC SURGERY | Age: 65
End: 2020-04-10

## 2020-04-10 NOTE — PROGRESS NOTES
LM with  for her to call office. She needs an appt VV so we can give her refill. She will be out of meds.  Put her in asap in 30 min slot

## 2020-04-14 ENCOUNTER — VIRTUAL VISIT (OUTPATIENT)
Dept: ORTHOPEDIC SURGERY | Age: 65
End: 2020-04-14

## 2020-04-14 DIAGNOSIS — M51.26 HNP (HERNIATED NUCLEUS PULPOSUS), LUMBAR: ICD-10-CM

## 2020-04-14 DIAGNOSIS — M51.36 DDD (DEGENERATIVE DISC DISEASE), LUMBAR: ICD-10-CM

## 2020-04-14 DIAGNOSIS — M54.16 LUMBAR RADICULOPATHY: Primary | ICD-10-CM

## 2020-04-14 RX ORDER — DULOXETIN HYDROCHLORIDE 30 MG/1
30 CAPSULE, DELAYED RELEASE ORAL DAILY
Qty: 90 CAP | Refills: 1 | Status: SHIPPED | OUTPATIENT
Start: 2020-04-14 | End: 2020-11-05

## 2020-04-14 NOTE — PROGRESS NOTES
Hernan Feng is a 63 y.o. female who was seen by synchronous (real-time) audio-video technology on 4/14/2020. She has a history of progressive low back pain extending into the BLE (L>R) in an L5 distribution to the ankle in the LLE and in a similar distribution to the mid-thigh in the RLE x 1+ year. She was last seen with Dr. Kimberlee Benavides. She did not tolerate increased Neurontin 300 mg TID due to lethargy. Cymbalta was causing dry mouth. Pt did not tolerate Lyrica 75 mg BID due to causing fuzziness. She self-started on Cymbalta 30 mg daily again. Pt has completed PT. Pt denies h/o lumbar spinal surgery. Pt underwent an L4/5 epidural on 8/28/18 with significant relief x 1 week, and some continued relief. Today, she is doing well. The Cymbalta is managing her pain well. Denies bladder/bowel dysfunction, saddle paresthesia, weakness, gait disturbance, or other neurological deficit. Pt at this time desires to  continue with current care/proceed with medication evaluation. Preliminary reading of lumbar plain films revealed mild disc space narrowing at L1-2, L2-3, L4-5, L5-S1. Small anterior osteophytes noted throughout the lumbar spine. No acute pathology identified. Lumbar spine MRI dated 7/30/18: Per report, multilevel degenerative joint and disc disease with degenerative facet arthropathy at L3/4 and L4/5. This is most significant at L5/S1 where there is moderate discogenic marrow edema. There is also a ventral protruding disc at L5/S1. Posterior disc protrusions/herniations at L1/2 through L5/S1 encroaching upon the ventral thecal sac without significant level spinal canal stenosis. Foraminal narrowing at L2-L5/S1 without nerve root impingement. Upon review myself, there appears to be an annular tear on the left at L4.      ASSESSMENT  63 y.o. female with back pain. Diagnoses and all orders for this visit:    1. Lumbar radiculopathy    2. DDD (degenerative disc disease), lumbar    3.  HNP (herniated nucleus pulposus), lumbar    Other orders  -     DULoxetine (CYMBALTA) 30 mg capsule; Take 1 Cap by mouth daily. Indications: neuropathic pain           IMPRESSION/PLAN    1) Pt was given information on back exercises. 2) cont cymbalta 30 mg  3) cont HEP   4) Ms. Andriy Hudson has a reminder for a \"due or due soon\" health maintenance. I have asked that she contact her primary care provider, Zhou Gomez MD, for follow-up on this health maintenance. 5) We have informed patient to notify us for immediate appointment if he has any worsening neurogical symptoms or if an emergency situation presents, then call 911  5) Pt will follow-up in 6 months. Risks and benefits of ongoing therapy have been reviewed with the patient.  is appropriate. No pain behaviors. Denies thoughts of harming self or others. Pt has a good risk to benefit ratio which allows the pt to function in a home environment without side effects. Assessment & Plan:   Diagnoses and all orders for this visit:    1. Lumbar radiculopathy    2. DDD (degenerative disc disease), lumbar    3. HNP (herniated nucleus pulposus), lumbar    Other orders  -     DULoxetine (CYMBALTA) 30 mg capsule; Take 1 Cap by mouth daily. Indications: neuropathic pain          Follow-up and Dispositions    · Return in about 6 months (around 10/14/2020) for garry. Subjective:   Cherie Green was seen for Back Pain        PAST MEDICAL HISTORY  No past medical history on file. MEDICATIONS  Current Outpatient Medications   Medication Sig Dispense Refill    DULoxetine (CYMBALTA) 30 mg capsule Take 1 Cap by mouth daily. Indications: neuropathic pain 90 Cap 1    aspirin-acetaminophen-caffeine (EXCEDRIN ES) 250-250-65 mg per tablet Take 1 Tab by mouth.  naproxen sodium 220 mg cap Take  by mouth.  ibuprofen 200 mg cap Take  by mouth.          ALLERGIES  No Known Allergies    SOCIAL HISTORY    Social History     Socioeconomic History    Marital status:      Spouse name: Not on file    Number of children: Not on file    Years of education: Not on file    Highest education level: Not on file   Occupational History    Not on file   Social Needs    Financial resource strain: Not on file    Food insecurity     Worry: Not on file     Inability: Not on file    Transportation needs     Medical: Not on file     Non-medical: Not on file   Tobacco Use    Smoking status: Never Smoker    Smokeless tobacco: Never Used   Substance and Sexual Activity    Alcohol use: No    Drug use: No    Sexual activity: Not on file   Lifestyle    Physical activity     Days per week: Not on file     Minutes per session: Not on file    Stress: Not on file   Relationships    Social connections     Talks on phone: Not on file     Gets together: Not on file     Attends [employer] service: Not on file     Active member of club or organization: Not on file     Attends meetings of clubs or organizations: Not on file     Relationship status: Not on file    Intimate partner violence     Fear of current or ex partner: Not on file     Emotionally abused: Not on file     Physically abused: Not on file     Forced sexual activity: Not on file   Other Topics Concern    Not on file   Social History Narrative    Not on file       Pain Scale: /10    Pain Assessment  10/18/2019   Location of Pain Back   Location Modifiers (No Data)   Severity of Pain 1   Quality of Pain Dull   Quality of Pain Comment -   Duration of Pain A few minutes   Frequency of Pain Intermittent   Aggravating Factors (No Data)   Aggravating Factors Comment while in one position for too long   Limiting Behavior Some   Relieving Factors Rest;Exercises   Relieving Factors Comment -   Result of Injury No         ROS      Objective:       General: alert, cooperative, no distress, appears stated age  Constitutional:   no acute distress. Psychiatric: Affect and mood are appropriate.        Due to this being a TeleHealth evaluation, many elements of the physical examination are unable to be assessed. We discussed the expected course, resolution and complications of the diagnosis(es) in detail. Medication risks, benefits, costs, interactions, and alternatives were discussed as indicated. I advised her to contact the office if her condition worsens, changes or fails to improve as anticipated. She expressed understanding with the diagnosis(es) and plan. Pursuant to the emergency declaration under the [address] [place] [address], [facility] waiver authority and the [employer] Act, this Virtual  Visit was conducted, with patient's consent, to reduce the patient's risk of exposure to COVID-19 and provide continuity of care for an established patient. Services were provided through real time synchronous discussion virtually to substitute for in-person clinic visit. Patient verbally consented to this type of visit and that they might get a bill from the billing of this visit. This service was provided through telephone ,  the patient was located at home and  the provider was located at home. There was only the patient participating in the service. Start time 0847  End time [id number].      Pernell Maciel NP

## 2020-04-14 NOTE — PATIENT INSTRUCTIONS
Back Stretches: Exercises Introduction Here are some examples of exercises for stretching your back. Start each exercise slowly. Ease off the exercise if you start to have pain. Your doctor or physical therapist will tell you when you can start these exercises and which ones will work best for you. How to do the exercises Overhead stretch 1. Stand comfortably with your feet shoulder-width apart. 2. Looking straight ahead, raise both arms over your head and reach toward the ceiling. Do not allow your head to tilt back. 3. Hold for 15 to 30 seconds, then lower your arms to your sides. 4. Repeat 2 to 4 times. Side stretch 1. Stand comfortably with your feet shoulder-width apart. 2. Raise one arm over your head, and then lean to the other side. 3. Slide your hand down your leg as you let the weight of your arm gently stretch your side muscles. Hold for 15 to 30 seconds. 4. Repeat 2 to 4 times on each side. Press-up 1. Lie on your stomach, supporting your body with your forearms. 2. Press your elbows down into the floor to raise your upper back. As you do this, relax your stomach muscles and allow your back to arch without using your back muscles. As your press up, do not let your hips or pelvis come off the floor. 3. Hold for 15 to 30 seconds, then relax. 4. Repeat 2 to 4 times. Relax and rest  
1. Lie on your back with a rolled towel under your neck and a pillow under your knees. Extend your arms comfortably to your sides. 2. Relax and breathe normally. 3. Remain in this position for about 10 minutes. 4. If you can, do this 2 or 3 times each day. Follow-up care is a key part of your treatment and safety. Be sure to make and go to all appointments, and call your doctor if you are having problems. It's also a good idea to know your test results and keep a list of the medicines you take. Where can you learn more? Go to http://cristian-maral.info/ Enter W209 in the search box to learn more about \"Back Stretches: Exercises. \" Current as of: June 26, 2019Content Version: 12.4 © 1528-6041 [employer]. Care instructions adapted under license by [employer] (which disclaims liability or warranty for this information). If you have questions about a medical condition or this instruction, always ask your healthcare professional. [place] [address] any warranty or liability for your use of this information.

## 2020-10-27 ENCOUNTER — OFFICE VISIT (OUTPATIENT)
Dept: ORTHOPEDIC SURGERY | Age: 65
End: 2020-10-27

## 2020-10-27 VITALS
OXYGEN SATURATION: 96 % | SYSTOLIC BLOOD PRESSURE: 117 MMHG | DIASTOLIC BLOOD PRESSURE: 80 MMHG | TEMPERATURE: 97.3 F | HEART RATE: 66 BPM | BODY MASS INDEX: 25.76 KG/M2 | WEIGHT: 140 LBS | HEIGHT: 62 IN

## 2020-10-27 DIAGNOSIS — M51.36 DDD (DEGENERATIVE DISC DISEASE), LUMBAR: ICD-10-CM

## 2020-10-27 DIAGNOSIS — M54.16 LUMBAR RADICULOPATHY: Primary | ICD-10-CM

## 2020-10-27 PROCEDURE — 99213 OFFICE O/P EST LOW 20 MIN: CPT | Performed by: NURSE PRACTITIONER

## 2020-10-27 NOTE — PATIENT INSTRUCTIONS
Back Stretches: Exercises Introduction Here are some examples of exercises for stretching your back. Start each exercise slowly. Ease off the exercise if you start to have pain. Your doctor or physical therapist will tell you when you can start these exercises and which ones will work best for you. How to do the exercises Overhead stretch 1. Stand comfortably with your feet shoulder-width apart. 2. Looking straight ahead, raise both arms over your head and reach toward the ceiling. Do not allow your head to tilt back. 3. Hold for 15 to 30 seconds, then lower your arms to your sides. 4. Repeat 2 to 4 times. Side stretch 1. Stand comfortably with your feet shoulder-width apart. 2. Raise one arm over your head, and then lean to the other side. 3. Slide your hand down your leg as you let the weight of your arm gently stretch your side muscles. Hold for 15 to 30 seconds. 4. Repeat 2 to 4 times on each side. Press-up 1. Lie on your stomach, supporting your body with your forearms. 2. Press your elbows down into the floor to raise your upper back. As you do this, relax your stomach muscles and allow your back to arch without using your back muscles. As your press up, do not let your hips or pelvis come off the floor. 3. Hold for 15 to 30 seconds, then relax. 4. Repeat 2 to 4 times. Relax and rest  
1. Lie on your back with a rolled towel under your neck and a pillow under your knees. Extend your arms comfortably to your sides. 2. Relax and breathe normally. 3. Remain in this position for about 10 minutes. 4. If you can, do this 2 or 3 times each day. Follow-up care is a key part of your treatment and safety. Be sure to make and go to all appointments, and call your doctor if you are having problems. It's also a good idea to know your test results and keep a list of the medicines you take. Where can you learn more? Go to http://www.LearnBoost.com/ Enter C540 in the search box to learn more about \"Back Stretches: Exercises. \" Current as of: March 2, 2020               Content Version: 12.6 © 2006-2020 [employer]. Care instructions adapted under license by [employer] (which disclaims liability or warranty for this information). If you have questions about a medical condition or this instruction, always ask your healthcare professional. [place] [address] any warranty or liability for your use of this information.

## 2020-10-27 NOTE — PROGRESS NOTES
[address], [address]  [place], [address] [place] [address] Street  Phone: [phone]  Fax: [phone]    Arnie Alan  : 1955  PCP: Carol Ann Dexter MD    PROGRESS NOTE    HISTORY OF PRESENT ILLNESS:  Chief Complaint   Patient presents with    Back Pain     lower and to the left side     Kt Menard is a 63 y.o.  female with a history of progressive low back pain extending into the BLE (L>R) in an L5 distribution to the ankle in the LLE and in a similar distribution to the mid-thigh in the RLE x 1+ year. She was last seen with Dr. Kane Castillo. She did not tolerate increased Neurontin 300 mg TID due to lethargy. Cymbalta was causing dry mouth. Pt did not tolerate Lyrica 75 mg BID due to causing fuzziness. She self-started on Cymbalta 30 mg daily again. Pt has completed PT. Pt denies h/o lumbar spinal surgery. Pt underwent an L4/5 epidural on 18 with significant relief x 1 week, and some continued relief. The Cymbalta was managing her pain well. Today, the Cymbalta continues to work well. Denies bladder/bowel dysfunction, saddle paresthesia, weakness, gait disturbance, or other neurological deficit. Pt at this time desires to  continue with current care/proceed with medication evaluation.     Preliminary reading of lumbar plain films revealed mild disc space narrowing at L1-2, L2-3, L4-5, L5-S1. Small anterior osteophytes noted throughout the lumbar spine. No acute pathology identified.     Lumbar spine MRI dated 18: Per report, multilevel degenerative joint and disc disease with degenerative facet arthropathy at L3/4 and L4/5. This is most significant at L5/S1 where there is moderate discogenic marrow edema. There is also a ventral protruding disc at L5/S1. Posterior disc protrusions/herniations at L1/2 through L5/S1 encroaching upon the ventral thecal sac without significant level spinal canal stenosis.  Foraminal narrowing at L2-L5/S1 without nerve root impingement. Upon review myself, there appears to be an annular tear on the left at L4.       ASSESSMENT  63 y.o. female with back pain. Diagnoses and all orders for this visit:    1. Lumbar radiculopathy    2. DDD (degenerative disc disease), lumbar         IMPRESSION/PLAN    1) Pt was given information on back exercises. 2) cont Cymbalta, can try weaning at some point if she would like. 3) cont HEP  4) Ms. Sophie Moore has a reminder for a \"due or due soon\" health maintenance. I have asked that she contact her primary care provider, Sourav Mejia MD, for follow-up on this health maintenance. 5) We have informed patient to notify us for immediate appointment if he has any worsening neurogical symptoms or if an emergency situation presents, then call 911  6) Pt will follow-up in 6 months for med fu. Risks and benefits of ongoing therapy have been reviewed with the patient.  is appropriate. No pain behaviors. Denies thoughts of harming self or others. Pt has a good risk to benefit ratio which allows the pt to function in a home environment without side effects. PAST MEDICAL HISTORY  History reviewed. No pertinent past medical history. MEDICATIONS  Current Outpatient Medications   Medication Sig Dispense Refill    DULoxetine (CYMBALTA) 30 mg capsule Take 1 Cap by mouth daily. Indications: neuropathic pain 90 Cap 1    aspirin-acetaminophen-caffeine (EXCEDRIN ES) 250-250-65 mg per tablet Take 1 Tab by mouth.  naproxen sodium 220 mg cap Take  by mouth.  ibuprofen 200 mg cap Take  by mouth.          ALLERGIES  No Known Allergies    SOCIAL HISTORY    Social History     Socioeconomic History    Marital status:      Spouse name: Not on file    Number of children: Not on file    Years of education: Not on file    Highest education level: Not on file   Occupational History    Not on file   Social Needs    Financial resource strain: Not on file   [employer] insecurity     Worry: Not on file     Inability: Not on file    Transportation needs     Medical: Not on file     Non-medical: Not on file   Tobacco Use    Smoking status: Never Smoker    Smokeless tobacco: Never Used   Substance and Sexual Activity    Alcohol use: No    Drug use: No    Sexual activity: Not on file   Lifestyle    Physical activity     Days per week: Not on file     Minutes per session: Not on file    Stress: Not on file   Relationships    Social connections     Talks on phone: Not on file     Gets together: Not on file     Attends Baptism service: Not on file     Active member of club or organization: Not on file     Attends meetings of clubs or organizations: Not on file     Relationship status: Not on file    Intimate partner violence     Fear of current or ex partner: Not on file     Emotionally abused: Not on file     Physically abused: Not on file     Forced sexual activity: Not on file   Other Topics Concern    Not on file   Social History Narrative    Not on file       SUBJECTIVE    Pain Scale: 1/10    Pain Assessment  10/27/2020   Location of Pain Back   Location Modifiers (No Data)   Severity of Pain 1   Quality of Pain Sharp; Aching   Quality of Pain Comment -   Duration of Pain A few minutes   Frequency of Pain Intermittent   Aggravating Factors Other (Comment)   Aggravating Factors Comment leaning over and pulling   Limiting Behavior No   Relieving Factors Exercises; Other (Comment)   Relieving Factors Comment RX meds   Result of Injury No       Accompanied by self. REVIEW OF SYSTEMS  ROS    Constitutional: Negative for fever, chills, or weight change. Respiratory: Negative for cough or shortness of breath. Cardiovascular: Negative for chest pain or palpitations. Gastrointestinal: Negative for acid reflux, change in bowel habits, or constipation. Genitourinary: Negative for incontinence, dysuria and flank pain. Musculoskeletal: Positive for back pain.   Skin: Negative for rash. Neurological: Negative for headaches, dizziness, or numbness. Endo/Heme/Allergies: Negative . Psychiatric/Behavioral: Negative. PHYSICAL EXAMINATION  Visit Vitals  /80 (BP 1 Location: Left arm, BP Patient Position: Sitting)   Pulse 66   Temp 97.3 °F (36.3 °C) (Skin)   Ht 5' 2\" (1.575 m)   Wt 140 lb (63.5 kg)   LMP  (LMP Unknown)   SpO2 96%   BMI 25.61 kg/m²       Constitutional: Well developed,  well nourished,  awake, alert, and in no acute distress. Neurological:  Sensation to light touch is intact. Psychiatric: Affect and mood are appropriate. Integumentary: No rashes or abrasions noted on exposed areas,  warm, dry and intact. Cardiovascular/Peripheral Vascular:  No peripheral edema is noted. Lymphatic:  No evidence of lymphedema. No cervical lymphadenopathy. SPINE/MUSCULOSKELETAL EXAM    Lumbar spine:  No rash, ecchymosis, or gross obliquity. No fasciculations. No focal atrophy is noted. Range of motion is intact. Tenderness to palpation to back pain. SI joints non-tender. Trochanters non tender. Musculoskeletal:  No pain with extension, axial loading, or forward flexion. No pain with internal or external rotation of her hips. MOTOR     Hip Flex  Quads Hamstrings Ankle DF EHL Ankle PF   Right +4/5 +4/5 +4/5 +4/5 +4/5 +4/5   Left +4/5 +4/5 +4/5 +4/5 +4/5 +4/5     Straight Leg raise - bilaterally. normal gait and station    Ambulation without assistive device. full weight bearing, non-antalgic gait.     Ahmet Santana, YUE

## 2020-11-05 RX ORDER — DULOXETIN HYDROCHLORIDE 30 MG/1
CAPSULE, DELAYED RELEASE ORAL
Qty: 90 CAP | Refills: 0 | Status: SHIPPED | OUTPATIENT
Start: 2020-11-05 | End: 2021-01-29

## 2021-01-29 RX ORDER — DULOXETIN HYDROCHLORIDE 30 MG/1
CAPSULE, DELAYED RELEASE ORAL
Qty: 90 CAP | Refills: 0 | Status: SHIPPED | OUTPATIENT
Start: 2021-01-29 | End: 2021-05-04 | Stop reason: SDUPTHER

## 2021-05-04 ENCOUNTER — OFFICE VISIT (OUTPATIENT)
Dept: ORTHOPEDIC SURGERY | Age: 66
End: 2021-05-04
Payer: COMMERCIAL

## 2021-05-04 VITALS
HEART RATE: 85 BPM | TEMPERATURE: 97.4 F | OXYGEN SATURATION: 95 % | BODY MASS INDEX: 25.24 KG/M2 | WEIGHT: 138 LBS | RESPIRATION RATE: 16 BRPM

## 2021-05-04 DIAGNOSIS — G89.29 CHRONIC LEFT SHOULDER PAIN: Primary | ICD-10-CM

## 2021-05-04 DIAGNOSIS — M25.512 CHRONIC LEFT SHOULDER PAIN: Primary | ICD-10-CM

## 2021-05-04 PROCEDURE — 99213 OFFICE O/P EST LOW 20 MIN: CPT | Performed by: NURSE PRACTITIONER

## 2021-05-04 RX ORDER — DULOXETIN HYDROCHLORIDE 30 MG/1
30 CAPSULE, DELAYED RELEASE ORAL DAILY
Qty: 90 CAP | Refills: 1 | Status: SHIPPED | OUTPATIENT
Start: 2021-05-04

## 2021-05-04 NOTE — PROGRESS NOTES
[address]  [address], [address]  [place], [address] [place] [address] Street  Phone: [phone]  Fax: [phone]    [place] Streeter  : 1955  PCP: Estrella Tran MD    PROGRESS NOTE    HISTORY OF PRESENT ILLNESS:  No chief complaint on file. Cynthia Luna is a 61 y.o.  female with history of progressive low back pain extending into the BLE (L>R) in an L5 distribution to the ankle in the LLE and in a similar distribution to the mid-thigh in the RLE x 1+ year.  She did not tolerate increased Neurontin 300 mg TID due to lethargy. Cymbalta was causing dry mouth but managed her pain well. Pt did not tolerate Lyrica 75 mg BID due to causing fuzziness. Pt has completed PT. Pt denies h/o lumbar spinal surgery. Pt underwent an L4/5 epidural on 18 with significant relief x 1 week, and some continued relief.   The Cymbalta was managing her pain well. She was going to try weaning off of it to see how her pain did. Today,  She states the Cymbalta is managing her pain well. she states she is having about a year and a half of hand tingling and arm pain. She is having a horrible pain in her left shoulder. Denies bladder/bowel dysfunction, saddle paresthesia, weakness, gait disturbance, or other neurological deficit.  Pt at this time desires to  continue with current care/proceed with medication evaluation.     Preliminary reading of lumbar plain films revealed mild disc space narrowing at L1-2, L2-3, L4-5, L5-S1. Small anterior osteophytes noted throughout the lumbar spine. No acute pathology identified.     Lumbar spine MRI dated 18: Per report, multilevel degenerative joint and disc disease with degenerative facet arthropathy at L3/4 and L4/5. This is most significant at L5/S1 where there is moderate discogenic marrow edema. There is also a ventral protruding disc at L5/S1.  Posterior disc protrusions/herniations at L1/2 through L5/S1 encroaching upon the ventral thecal sac without significant level spinal canal stenosis. Foraminal narrowing at L2-L5/S1 without nerve root impingement. Upon review myself, there appears to be an annular tear on the left at L4.       ASSESSMENT  61 y.o. female with back and left shoulder pain. Diagnoses and all orders for this visit:    1. Chronic left shoulder pain  -     REFERRAL TO ORTHOPEDICS    Other orders  -     DULoxetine (CYMBALTA) 30 mg capsule; Take 1 Cap by mouth daily. Indications: neuropathic pain         IMPRESSION/PLAN    1) Pt was given information on back and shoulder exercises. 2) cont Cymbalta   3) referral to ortho for left shoulder pain  4) Ms. Yoni Szymanski has a reminder for a \"due or due soon\" health maintenance. I have asked that she contact her primary care provider, Essie Obrien MD, for follow-up on this health maintenance. 5) We have informed patient to notify us for immediate appointment if he has any worsening neurogical symptoms or if an emergency situation presents, then call 911  6) Pt will follow-up in 6 months for med fu. Risks and benefits of ongoing therapy have been reviewed with the patient.  is appropriate. No pain behaviors. Denies thoughts of harming self or others. Pt has a good risk to benefit ratio which allows the pt to function in a home environment without side effects. PAST MEDICAL HISTORY  History reviewed. No pertinent past medical history. MEDICATIONS  Current Outpatient Medications   Medication Sig Dispense Refill    DULoxetine (CYMBALTA) 30 mg capsule Take 1 Cap by mouth daily. Indications: neuropathic pain 90 Cap 1    aspirin-acetaminophen-caffeine (EXCEDRIN ES) 250-250-65 mg per tablet Take 1 Tab by mouth.  ibuprofen 200 mg cap Take  by mouth.  naproxen sodium 220 mg cap Take  by mouth.          ALLERGIES  No Known Allergies    SOCIAL HISTORY    Social History     Socioeconomic History    Marital status:      Spouse name: Not on file    Number of children: Not on file    Years of education: Not on file    Highest education level: Not on file   Occupational History    Not on file   Social Needs    Financial resource strain: Not on file    Food insecurity     Worry: Not on file     Inability: Not on file    Transportation needs     Medical: Not on file     Non-medical: Not on file   Tobacco Use    Smoking status: Never Smoker    Smokeless tobacco: Never Used   Substance and Sexual Activity    Alcohol use: No    Drug use: No    Sexual activity: Not on file   Lifestyle    Physical activity     Days per week: Not on file     Minutes per session: Not on file    Stress: Not on file   Relationships    Social connections     Talks on phone: Not on file     Gets together: Not on file     Attends Taoist service: Not on file     Active member of club or organization: Not on file     Attends meetings of clubs or organizations: Not on file     Relationship status: Not on file    Intimate partner violence     Fear of current or ex partner: Not on file     Emotionally abused: Not on file     Physically abused: Not on file     Forced sexual activity: Not on file   Other Topics Concern    Not on file   Social History Narrative    Not on file       SUBJECTIVE        Pain Scale: 2/10    Pain Assessment  5/4/2021   Location of Pain Shoulder   Location Modifiers Left   Severity of Pain 2   Quality of Pain -   Quality of Pain Comment -   Duration of Pain -   Frequency of Pain -   Aggravating Factors -   Aggravating Factors Comment -   Limiting Behavior -   Relieving Factors -   Relieving Factors Comment -   Result of Injury -       Accompanied by self. REVIEW OF SYSTEMS  ROS    Constitutional: Negative for fever, chills, or weight change. Respiratory: Negative for cough or shortness of breath. Cardiovascular: Negative for chest pain or palpitations. Gastrointestinal: Negative for acid reflux, change in bowel habits, or constipation.   Genitourinary: Negative for incontinence, dysuria and flank pain. Musculoskeletal: Positive for left shoulder pain and back  pain. Skin: Negative for rash. Neurological: Negative for headaches, dizziness, or numbness. Endo/Heme/Allergies: Negative . Psychiatric/Behavioral: Negative. PHYSICAL EXAMINATION  Visit Vitals  Pulse 85   Temp 97.4 °F (36.3 °C)   Resp 16   Wt 138 lb (62.6 kg)   LMP  (LMP Unknown)   SpO2 95%   BMI 25.24 kg/m²       Constitutional: Well developed,  well nourished,  awake, alert, and in no acute distress. Neurological:  Sensation to light touch is intact. Psychiatric: Affect and mood are appropriate. Integumentary: No rashes or abrasions noted on exposed areas,  warm, dry and intact. Cardiovascular/Peripheral Vascular:  No peripheral edema is noted. Lymphatic:  No evidence of lymphedema. No cervical lymphadenopathy. SPINE/MUSCULOSKELETAL EXAM     Pain with rotation of left shoulder, + cans test left   Cervical spine  +4/5    Lumbar spine:  No rash, ecchymosis, or gross obliquity. No fasciculations. No focal atrophy is noted. Range of motion is intact. Tenderness to palpation to back pain. SI joints non-tender. Trochanters non tender.       Musculoskeletal:  No pain with extension, axial loading, or forward flexion. No pain with internal or external rotation of her hips.      MOTOR       Hip Flex  Quads Hamstrings Ankle DF EHL Ankle PF   Right +4/5 +4/5 +4/5 +4/5 +4/5 +4/5   Left +4/5 +4/5 +4/5 +4/5 +4/5 +4/5      Straight Leg raise - bilaterally.      normal gait and station     Ambulation without assistive device.  full weight bearing, non-antalgic gait.       Seth Greco NP

## 2021-05-04 NOTE — PATIENT INSTRUCTIONS
Shoulder Arthritis: Exercises Introduction Here are some examples of exercises for you to try. The exercises may be suggested for a condition or for rehabilitation. Start each exercise slowly. Ease off the exercises if you start to have pain. You will be told when to start these exercises and which ones will work best for you. How to do the exercises Shoulder flexion (lying down) To make a wand for this exercise, use a piece of PVC pipe or a broom handle with the broom removed. Make the wand about a foot wider than your shoulders. 1. Lie on your back, holding a wand with both hands. Your palms should face down as you hold the wand. 2. Keeping your elbows straight, slowly raise your arms over your head. Raise them until you feel a stretch in your shoulders, upper back, and chest. 
3. Hold for 15 to 30 seconds. 4. Repeat 2 to 4 times. Shoulder rotation (lying down) To make a wand for this exercise, use a piece of PVC pipe or a broom handle with the broom removed. Make the wand about a foot wider than your shoulders. 1. Lie on your back. Hold a wand with both hands with your elbows bent and palms up. 2. Keep your elbows close to your body, and move the wand across your body toward the sore arm. 3. Hold for 8 to 12 seconds. 4. Repeat 2 to 4 times. Shoulder internal rotation with towel 1. Hold a towel above and behind your head with the arm that is not sore. 2. With your sore arm, reach behind your back and grasp the towel. 3. With the arm above your head, pull the towel upward. Do this until you feel a stretch on the front and outside of your sore shoulder. 4. Hold 15 to 30 seconds. 5. Repeat 2 to 4 times. Shoulder blade squeeze 1. Stand with your arms at your sides, and squeeze your shoulder blades together. Do not raise your shoulders up as you squeeze. 2. Hold 6 seconds. 3. Repeat 8 to 12 times. Resisted rows For this exercise, you will need elastic exercise material, such as surgical tubing or Thera-Band. 1. Put the band around a solid object at about waist level. (A bedpost will work well.) Each hand should hold an end of the band. 2. With your elbows at your sides and bent to 90 degrees, pull the band back. Your shoulder blades should move toward each other. Return to the starting position. 3. Repeat 8 to 12 times. External rotator strengthening exercise 1. Start by tying a piece of elastic exercise material to a doorknob. You can use surgical tubing or Thera-Band. (You may also hold one end of the band in each hand.) 2. Stand or sit with your shoulder relaxed and your elbow bent 90 degrees. Your upper arm should rest comfortably against your side. Squeeze a rolled towel between your elbow and your body for comfort. This will help keep your arm at your side. 3. Hold one end of the elastic band with the hand of the painful arm. 4. Start with your forearm across your belly. Slowly rotate the forearm out away from your body. Keep your elbow and upper arm tucked against the towel roll or the side of your body until you begin to feel tightness in your shoulder. Slowly move your arm back to where you started. 5. Repeat 8 to 12 times. Internal rotator strengthening exercise 1. Start by tying a piece of elastic exercise material to a doorknob. You can use surgical tubing or Thera-Band. 2. Stand or sit with your shoulder relaxed and your elbow bent 90 degrees. Your upper arm should rest comfortably against your side. Squeeze a rolled towel between your elbow and your body for comfort. This will help keep your arm at your side. 3. Hold one end of the elastic band in the hand of the painful arm. 4. Slowly rotate your forearm toward your body until it touches your belly. Slowly move it back to where you started. 5. Keep your elbow and upper arm firmly tucked against the towel roll or at your side. 6. Repeat 8 to 12 times. Pendulum swing If you have pain in your back, do not do this exercise. 1. Hold on to a table or the back of a chair with your good arm. Then bend forward a little and let your sore arm hang straight down. This exercise does not use the arm muscles. Rather, use your legs and your hips to create movement that makes your arm swing freely. 2. Use the movement from your hips and legs to guide the slightly swinging arm back and forth like a pendulum (or elephant trunk). Then guide it in circles that start small (about the size of a dinner plate). Make the circles a bit larger each day, as your pain allows. 3. Do this exercise for 5 minutes, 5 to 7 times each day. 4. As you have less pain, try bending over a little farther to do this exercise. This will increase the amount of movement at your shoulder. Follow-up care is a key part of your treatment and safety. Be sure to make and go to all appointments, and call your doctor if you are having problems. It's also a good idea to know your test results and keep a list of the medicines you take. Where can you learn more? Go to http://www.gray.com/ Enter H562 in the search box to learn more about \"Shoulder Arthritis: Exercises. \" Current as of: November 16, 2020               Content Version: 12.8 © 2006-2021 Healthwise[employer]. Care instructions adapted under license by [employer] (which disclaims liability or warranty for this information). If you have questions about a medical condition or this instruction, always ask your healthcare professional. [place] [address] any warranty or liability for your use of this information. Shoulder Blade: Exercises Introduction Here are some examples of exercises for you to try. The exercises may be suggested for a condition or for rehabilitation. Start each exercise slowly. Ease off the exercises if you start to have pain.  
You will be told when to start these exercises and which ones will work best for you. 
How to do the exercises Shoulder roll 1. Stand tall with your chin slightly tucked. Imagine that a string at the top of your head is pulling you straight up. 2. Keep your arms relaxed. All motion will be in your shoulders. 3. Shrug your shoulders up toward your ears, then up and back. [place] your shoulders down and back, like you're sliding your hands down into your back pants pockets. 4. Repeat the circles at least 2 to 4 times. 5. This exercise is also helpful anytime you want to relax. Lower neck and upper back stretch 1. With your arms about shoulder height, clasp your hands in front of you. 2. Drop your chin toward your chest. 
3. Reach straight forward so you are rounding your upper back. Think about pulling your shoulder blades apart. Magdalene Tavera feel a stretch across your upper back and shoulders. Hold for at least 6 seconds. 4. Repeat 2 to 4 times. Triceps stretch 1. Reach your arm straight up. 2. Keeping your elbow in place, bend your arm and reach your hand down behind your back. 3. With your other hand, apply gentle pressure to the bent elbow. Magdalene Tavera feel a stretch at the back of your upper arm and shoulder. Hold about 6 seconds. 4. Repeat 2 to 4 times with each arm. Shoulder stretch 1. Relax your shoulders. 2. Raise one arm to shoulder height, and reach it across your chest. 
3. Pull the arm slightly toward you with your other arm. This will help you get a gentle stretch. Hold for about 6 seconds. 4. Repeat 2 to 4 times. Shoulder blade squeeze 1. Sit or stand up tall with your arms at your sides. 2. Keep your shoulders relaxed and down, not shrugged. 3. Squeeze your shoulder blades together. Hold for 6 seconds, then relax. 4. Repeat 8 to 12 times. Straight-arm shoulder blade squeeze 1. Sit or stand tall. Relax your shoulders. 2. With palms down, hold your elastic tubing or band straight out in front of you.  
3. Start with slight tension in the tubing or band, with your hands about shoulder-width apart. 4. Slowly pull straight out to the sides, squeezing your shoulder blades together. Keep your arms straight and at shoulder height. Slowly release. 5. Repeat 8 to 12 times. Rowing 1. [place] your elastic tubing or band at about waist height. Take one end in each hand. 2. Sit or stand with your feet hip-width apart. 3. Hold your arms straight in front of you. Adjust your distance to create slight tension in the tubing or band. 4. Slightly tuck your chin. Relax your shoulders. 5. Without shrugging your shoulders, pull straight back. Your elbows will pass alongside your waist.   
Pull-downs 1. [place] your elastic tubing or band in the top of a closed door. Take one end in each hand. 2. Either sit or stand, depending on what is more comfortable. If you feel unsteady, sit on a chair. 3. Start with your arms up and comfortably apart, elbows straight. There should be a slight tension in the tubing or band. 4. Slightly tuck your chin, and look straight ahead. 5. Keeping your back straight, slowly pull down and back, bending your elbows. 6. Stop where your hands are level with your chin, in a \"goalpost\" position. 7. Repeat 8 to 12 times. Chest T stretch 1. Lie on your back. Raise your knees so they are bent. Plant your feet on the floor, hip-width apart. 2. Tuck your chin, and relax your shoulders. 3. Reach your arms straight out to the sides. If you don't feel a mild stretch in your shoulders and across your chest, use a foam roll or a tightly rolled blanket under your spine, from your tailbone to your head. 4. Relax in this position for at least 15 to 30 seconds while you breathe normally. Repeat 2 to 4 times. 5. As you get used to this stretch, keep adding a little more time until you are able relax in this position for 2 or 3 minutes. When you can relax for at least 2 minutes, you only need to do the exercise 1 time per session.    
Chest goalpost stretch 1. Lie on your back. Raise your knees so they are bent. Plant your feet on the floor, hip-width apart. 2. Tuck your chin, and relax your shoulders. 3. Reach your arms straight out to the sides. 4. Bend your arms at the elbows, with your hands pointed toward the top of your head. Your arms should make an L on either side of your head. Your palms should be facing up. 5. If you don't feel a mild stretch in your shoulders and across your chest, use a foam roll or tightly rolled blanket under your spine, from your tailbone to your head. 6. Relax in this position for at least 15 to 30 seconds while you breathe normally. Repeat 2 to 4 times. 7. Each day you do this exercise, add a little more time until you can relax in this position for 2 or 3 minutes. When you can relax for at least 2 minutes, you only need to do the exercise 1 time per session. Follow-up care is a key part of your treatment and safety. Be sure to make and go to all appointments, and call your doctor if you are having problems. It's also a good idea to know your test results and keep a list of the medicines you take. Where can you learn more? Go to http://www.gray.com/ Enter (73) 5317 0608 in the search box to learn more about \"Shoulder Blade: Exercises. \" Current as of: November 16, 2020               Content Version: 12.8 © 3533-8827 Healthwise, Incorporated. Care instructions adapted under license by [employer] (which disclaims liability or warranty for this information). If you have questions about a medical condition or this instruction, always ask your healthcare professional. [place] [address] any warranty or liability for your use of this information.

## 2021-05-12 ENCOUNTER — OFFICE VISIT (OUTPATIENT)
Dept: ORTHOPEDIC SURGERY | Age: 66
End: 2021-05-12
Payer: COMMERCIAL

## 2021-05-12 VITALS
OXYGEN SATURATION: 97 % | WEIGHT: 135 LBS | HEIGHT: 62 IN | TEMPERATURE: 97.2 F | HEART RATE: 69 BPM | BODY MASS INDEX: 24.84 KG/M2

## 2021-05-12 DIAGNOSIS — G89.29 CHRONIC LEFT SHOULDER PAIN: ICD-10-CM

## 2021-05-12 DIAGNOSIS — M25.512 CHRONIC LEFT SHOULDER PAIN: ICD-10-CM

## 2021-05-12 DIAGNOSIS — M19.012 PRIMARY OSTEOARTHRITIS OF LEFT SHOULDER: Primary | ICD-10-CM

## 2021-05-12 PROCEDURE — 99203 OFFICE O/P NEW LOW 30 MIN: CPT | Performed by: ORTHOPAEDIC SURGERY

## 2021-05-12 PROCEDURE — 20610 DRAIN/INJ JOINT/BURSA W/O US: CPT | Performed by: ORTHOPAEDIC SURGERY

## 2021-05-12 PROCEDURE — 73030 X-RAY EXAM OF SHOULDER: CPT | Performed by: ORTHOPAEDIC SURGERY

## 2021-05-12 RX ORDER — TRIAMCINOLONE ACETONIDE 40 MG/ML
40 INJECTION, SUSPENSION INTRA-ARTICULAR; INTRAMUSCULAR ONCE
Status: COMPLETED | OUTPATIENT
Start: 2021-05-12 | End: 2021-05-12

## 2021-05-12 RX ADMIN — TRIAMCINOLONE ACETONIDE 40 MG: 40 INJECTION, SUSPENSION INTRA-ARTICULAR; INTRAMUSCULAR at 16:46

## 2021-05-12 NOTE — PROGRESS NOTES
Patient: Pamela Salas                MRN: [id number]       SSN: [id number]  YOB: 1955        AGE: 61 y.o. SEX: female  Body mass index is 24.69 kg/m². PCP: Shira Ward MD  05/12/21    CHIEF COMPLAINT: Left shoulder pain 6/10    HPI: Pamela Salas is a 61 y.o. female patient who presents to the office today with 2-2 and half months of left shoulder pain. She reports insidious onset of left shoulder pain and decreased range of motion including reaching back behind her or reaching up her back. She denies any specific injury or trauma. She has not had any treatment for this including physical therapy or injections. She has not had any advanced imaging. She is not currently taking any medication for this pain. History reviewed. No pertinent past medical history. Family History   Problem Relation Age of Onset    Stroke Mother    [address] [facility] Arthritis-osteo Mother     Stroke Father     Parkinson's Disease Father     Cancer Sister        Current Outpatient Medications   Medication Sig Dispense Refill    DULoxetine (CYMBALTA) 30 mg capsule Take 1 Cap by mouth daily. Indications: neuropathic pain 90 Cap 1    aspirin-acetaminophen-caffeine (EXCEDRIN ES) 250-250-65 mg per tablet Take 1 Tab by mouth.  ibuprofen 200 mg cap Take  by mouth.  naproxen sodium 220 mg cap Take  by mouth. No Known Allergies    History reviewed. No pertinent surgical history.     Social History     Socioeconomic History    Marital status:      Spouse name: Not on file    Number of children: Not on file    Years of education: Not on file    Highest education level: Not on file   Occupational History    Not on file   Social Needs    Financial resource strain: Not on file    Food insecurity     Worry: Not on file     Inability: Not on file    Transportation needs     Medical: Not on file     Non-medical: Not on file   Tobacco Use    Smoking status: Never Smoker    Smokeless tobacco: Never Used   Substance and Sexual Activity    Alcohol use: No    Drug use: No    Sexual activity: Not on file   Lifestyle    Physical activity     Days per week: Not on file     Minutes per session: Not on file    Stress: Not on file   Relationships    Social connections     Talks on phone: Not on file     Gets together: Not on file     Attends [employer] service: Not on file     Active member of club or organization: Not on file     Attends meetings of clubs or organizations: Not on file     Relationship status: Not on file    Intimate partner violence     Fear of current or ex partner: Not on file     Emotionally abused: Not on file     Physically abused: Not on file     Forced sexual activity: Not on file   Other Topics Concern    Not on file   Social History Narrative    Not on file       REVIEW OF SYSTEMS:      CON: negative for recent weight loss/gain, fever, or chills  EYE: negative for double or blurry vision  ENT: negative for hoarseness  RS:   negative for cough, URI, SOB  CV:  negative for chest pain, palpitations  GI:    negative for blood in stool, nausea/vomiting  :  negative for blood in urine  MS: As per HPI  Other systems reviewed and noted below. PHYSICAL EXAMINATION:  Visit Vitals  Pulse 69   Temp 97.2 °F (36.2 °C) (Skin)   Ht 5' 2\" (1.575 m)   Wt 135 lb (61.2 kg)   LMP  (LMP Unknown)   SpO2 97%   BMI 24.69 kg/m²     Body mass index is 24.69 kg/m². GENERAL: Alert and oriented x3, in no acute distress, well-developed, well-nourished. HEENT: Normocephalic, atraumatic. RESP: Non labored breathing with equal chest rise on inspiration. CV: Well perfused extremities. No cyanosis or clubbing noted. ABDOMEN: Soft, non-tender, non-distended.    Shoulder Examination     R   L  ROM   FF  Full   160  ER  Full   40   IR  Full   T12  Rotator Cuff Pain   Supra  -   -   Infra  -   -   Subscap -   -  Crepitus  -   +  Effusion  -   -  Warmth  -   -   Erythema  -   -  Instability  -   -  AC Joint TTP  -   -  Clavicle   Deformity -   -   TTP  -   -  Proximal Humerus   Deformity -   -   TTP  -   -  Deltoid Strength 5   5  Biceps Strength 5   5  Biceps Deformity -   -  Biceps Groove Pain -   -  Impingement Sign -   -       IMAGING:  X-rays 4 views left shoulder taken the office today. These show glenohumeral joint osteoarthritis which is mild. There is an inferior humeral osteophyte. No fractures. ASSESSMENT & PLAN  Diagnosis: Left shoulder glenohumeral joint osteoarthritis    Marychuy Cui has symptomatic left shoulder glenohumeral joint osteoarthritis with slight stiffness in her shoulder. I recommended conservative treatment with a corticosteroid injection today. She was agreeable to this. She will follow-up as needed.       [facility]  OFFICE PROCEDURE PROGRESS NOTE        Chart reviewed for the following:   Denita Purdy MD, have reviewed the History, Physical and updated the Allergic reactions for [place] performed immediately prior to start of procedure:   Denita Purdy MD, have performed the following reviews on Cyrilla Palm prior to the start of the procedure:            * Patient was identified by name and date of birth   * Agreement on procedure being performed was verified  * Risks and Benefits explained to the patient  * Procedure site verified and marked as necessary  * Patient was positioned for comfort  * Consent was signed and verified    Time: 4:45 PM    Location: Left shoulder joint intra-articular injection    Kenalog 40mg & 3cc Lidocaine    Date of procedure: 5/12/2021    Procedure performed by:  Marleen Razo MD    Provider assisted by: Luis Angel Garcia LPN    Patient assisted by: self    How tolerated by patient: tolerated the procedure well with no complications    Post Procedural Pain Scale: 0 - No Hurt    Comments: none      Electronically signed by: Prateek Gomes MD

## 2021-11-17 NOTE — TELEPHONE ENCOUNTER
Last Visit: 21 with NP Caron Apgar  Next Appointment: Advised to follow-up in 6 months  Previous Refill Encounter(s): 21 #90 with 1 refill    Requested Prescriptions     Pending Prescriptions Disp Refills    DULoxetine (CYMBALTA) 30 mg capsule 90 Capsule 1     Sig: Take 1 Capsule by mouth daily.  Indications: neuropathic pain

## 2021-11-18 RX ORDER — DULOXETIN HYDROCHLORIDE 30 MG/1
30 CAPSULE, DELAYED RELEASE ORAL DAILY
Qty: 90 CAPSULE | Refills: 1 | OUTPATIENT
Start: 2021-11-18

## 2022-03-18 PROBLEM — M54.16 LUMBAR RADICULOPATHY: Status: ACTIVE | Noted: 2018-06-20

## 2022-03-19 PROBLEM — M54.16 LUMBAR NEURITIS: Status: ACTIVE | Noted: 2018-04-02

## 2022-03-19 PROBLEM — M47.816 SPONDYLOSIS OF LUMBAR REGION WITHOUT MYELOPATHY OR RADICULOPATHY: Status: ACTIVE | Noted: 2018-04-02

## 2022-03-20 PROBLEM — M51.26 HNP (HERNIATED NUCLEUS PULPOSUS), LUMBAR: Status: ACTIVE | Noted: 2018-08-22

## 2022-03-20 PROBLEM — M51.36 DDD (DEGENERATIVE DISC DISEASE), LUMBAR: Status: ACTIVE | Noted: 2018-04-02

## 2022-03-20 PROBLEM — M51.369 DDD (DEGENERATIVE DISC DISEASE), LUMBAR: Status: ACTIVE | Noted: 2018-04-02

## 2023-03-10 ENCOUNTER — HOSPITAL ENCOUNTER (OUTPATIENT)
Age: 68
End: 2023-03-10
Payer: MEDICARE

## 2023-03-10 VITALS — HEIGHT: 62 IN | BODY MASS INDEX: 24.84 KG/M2 | WEIGHT: 135 LBS

## 2023-03-10 DIAGNOSIS — Z12.31 VISIT FOR SCREENING MAMMOGRAM: ICD-10-CM

## 2023-03-10 PROCEDURE — 77063 BREAST TOMOSYNTHESIS BI: CPT

## 2024-03-21 ENCOUNTER — TRANSCRIBE ORDERS (OUTPATIENT)
Facility: HOSPITAL | Age: 69
End: 2024-03-21

## 2024-03-21 DIAGNOSIS — Z12.31 VISIT FOR SCREENING MAMMOGRAM: Primary | ICD-10-CM

## 2024-03-25 ENCOUNTER — HOSPITAL ENCOUNTER (OUTPATIENT)
Age: 69
Discharge: HOME | End: 2024-03-28
Attending: FAMILY MEDICINE
Payer: MEDICARE

## 2024-03-25 VITALS — HEIGHT: 62 IN | WEIGHT: 123 LBS | BODY MASS INDEX: 22.63 KG/M2

## 2024-03-25 DIAGNOSIS — Z12.31 VISIT FOR SCREENING MAMMOGRAM: ICD-10-CM

## 2024-03-25 PROCEDURE — 77063 BREAST TOMOSYNTHESIS BI: CPT

## 2025-03-26 ENCOUNTER — HOSPITAL ENCOUNTER (OUTPATIENT)
Age: 70
Discharge: HOME | End: 2025-03-29
Attending: FAMILY MEDICINE
Payer: MEDICARE

## 2025-03-26 VITALS — HEIGHT: 62 IN | WEIGHT: 123 LBS | BODY MASS INDEX: 22.63 KG/M2

## 2025-03-26 DIAGNOSIS — Z12.31 VISIT FOR SCREENING MAMMOGRAM: ICD-10-CM

## 2025-03-26 PROCEDURE — 77063 BREAST TOMOSYNTHESIS BI: CPT
